# Patient Record
Sex: MALE | Race: OTHER | HISPANIC OR LATINO | URBAN - METROPOLITAN AREA
[De-identification: names, ages, dates, MRNs, and addresses within clinical notes are randomized per-mention and may not be internally consistent; named-entity substitution may affect disease eponyms.]

---

## 2018-11-11 ENCOUNTER — EMERGENCY (EMERGENCY)
Facility: HOSPITAL | Age: 43
LOS: 0 days | Discharge: HOME | End: 2018-11-11
Attending: STUDENT IN AN ORGANIZED HEALTH CARE EDUCATION/TRAINING PROGRAM | Admitting: STUDENT IN AN ORGANIZED HEALTH CARE EDUCATION/TRAINING PROGRAM

## 2018-11-11 VITALS
RESPIRATION RATE: 18 BRPM | HEART RATE: 68 BPM | OXYGEN SATURATION: 98 % | TEMPERATURE: 97 F | SYSTOLIC BLOOD PRESSURE: 142 MMHG | DIASTOLIC BLOOD PRESSURE: 80 MMHG

## 2018-11-11 VITALS
HEART RATE: 63 BPM | DIASTOLIC BLOOD PRESSURE: 66 MMHG | OXYGEN SATURATION: 100 % | RESPIRATION RATE: 18 BRPM | SYSTOLIC BLOOD PRESSURE: 126 MMHG | TEMPERATURE: 98 F

## 2018-11-11 DIAGNOSIS — R89.9 UNSPECIFIED ABNORMAL FINDING IN SPECIMENS FROM OTHER ORGANS, SYSTEMS AND TISSUES: ICD-10-CM

## 2018-11-11 DIAGNOSIS — K21.9 GASTRO-ESOPHAGEAL REFLUX DISEASE WITHOUT ESOPHAGITIS: ICD-10-CM

## 2018-11-11 DIAGNOSIS — N18.9 CHRONIC KIDNEY DISEASE, UNSPECIFIED: ICD-10-CM

## 2018-11-11 LAB
ALBUMIN SERPL ELPH-MCNC: 4.3 G/DL — SIGNIFICANT CHANGE UP (ref 3.5–5.2)
ALP SERPL-CCNC: 71 U/L — SIGNIFICANT CHANGE UP (ref 30–115)
ALT FLD-CCNC: 6 U/L — SIGNIFICANT CHANGE UP (ref 0–41)
ANION GAP SERPL CALC-SCNC: 19 MMOL/L — HIGH (ref 7–14)
AST SERPL-CCNC: 9 U/L — SIGNIFICANT CHANGE UP (ref 0–41)
BASOPHILS # BLD AUTO: 0.05 K/UL — SIGNIFICANT CHANGE UP (ref 0–0.2)
BASOPHILS NFR BLD AUTO: 0.6 % — SIGNIFICANT CHANGE UP (ref 0–1)
BILIRUB SERPL-MCNC: 0.2 MG/DL — SIGNIFICANT CHANGE UP (ref 0.2–1.2)
BUN SERPL-MCNC: 49 MG/DL — HIGH (ref 10–20)
CALCIUM SERPL-MCNC: 7.1 MG/DL — LOW (ref 8.5–10.1)
CHLORIDE SERPL-SCNC: 105 MMOL/L — SIGNIFICANT CHANGE UP (ref 98–110)
CO2 SERPL-SCNC: 17 MMOL/L — SIGNIFICANT CHANGE UP (ref 17–32)
CREAT SERPL-MCNC: 7 MG/DL — CRITICAL HIGH (ref 0.7–1.5)
EOSINOPHIL # BLD AUTO: 0.34 K/UL — SIGNIFICANT CHANGE UP (ref 0–0.7)
EOSINOPHIL NFR BLD AUTO: 4 % — SIGNIFICANT CHANGE UP (ref 0–8)
GLUCOSE SERPL-MCNC: 97 MG/DL — SIGNIFICANT CHANGE UP (ref 70–99)
HCT VFR BLD CALC: 30.7 % — LOW (ref 42–52)
HGB BLD-MCNC: 9.6 G/DL — LOW (ref 14–18)
IMM GRANULOCYTES NFR BLD AUTO: 0.2 % — SIGNIFICANT CHANGE UP (ref 0.1–0.3)
LYMPHOCYTES # BLD AUTO: 1.02 K/UL — LOW (ref 1.2–3.4)
LYMPHOCYTES # BLD AUTO: 12 % — LOW (ref 20.5–51.1)
MCHC RBC-ENTMCNC: 30 PG — SIGNIFICANT CHANGE UP (ref 27–31)
MCHC RBC-ENTMCNC: 31.3 G/DL — LOW (ref 32–37)
MCV RBC AUTO: 95.9 FL — HIGH (ref 80–94)
MONOCYTES # BLD AUTO: 0.57 K/UL — SIGNIFICANT CHANGE UP (ref 0.1–0.6)
MONOCYTES NFR BLD AUTO: 6.7 % — SIGNIFICANT CHANGE UP (ref 1.7–9.3)
NEUTROPHILS # BLD AUTO: 6.52 K/UL — HIGH (ref 1.4–6.5)
NEUTROPHILS NFR BLD AUTO: 76.5 % — HIGH (ref 42.2–75.2)
NRBC # BLD: 0 /100 WBCS — SIGNIFICANT CHANGE UP (ref 0–0)
PLATELET # BLD AUTO: 269 K/UL — SIGNIFICANT CHANGE UP (ref 130–400)
POTASSIUM SERPL-MCNC: 5 MMOL/L — SIGNIFICANT CHANGE UP (ref 3.5–5)
POTASSIUM SERPL-SCNC: 5 MMOL/L — SIGNIFICANT CHANGE UP (ref 3.5–5)
PROT SERPL-MCNC: 6.7 G/DL — SIGNIFICANT CHANGE UP (ref 6–8)
RBC # BLD: 3.2 M/UL — LOW (ref 4.7–6.1)
RBC # FLD: 13.2 % — SIGNIFICANT CHANGE UP (ref 11.5–14.5)
SODIUM SERPL-SCNC: 141 MMOL/L — SIGNIFICANT CHANGE UP (ref 135–146)
WBC # BLD: 8.52 K/UL — SIGNIFICANT CHANGE UP (ref 4.8–10.8)
WBC # FLD AUTO: 8.52 K/UL — SIGNIFICANT CHANGE UP (ref 4.8–10.8)

## 2018-11-11 NOTE — ED ADULT NURSE NOTE - OBJECTIVE STATEMENT
pt reports he had blood work drawn a few days ago and was told his creatnine was elevated. Pt denies pain or discomfort but reports urinary frequency

## 2018-11-11 NOTE — ED PROVIDER NOTE - NSFOLLOWUPINSTRUCTIONS_ED_ALL_ED_FT
Chronic Kidney Disease, Adult    Chronic kidney disease (CKD) happens when the kidneys are damaged during a time of 3 or more months. The kidneys are two organs that do many important jobs in the body. These jobs include:    Removing wastes and extra fluids from the blood.  Making hormones that maintain the amount of fluid in your tissues and blood vessels.  Making sure that the body has the right amount of fluids and chemicals.    Most of the time, this condition does not go away, but it can usually be controlled. Steps must be taken to slow down the kidney damage or stop it from getting worse. Otherwise, the kidneys may stop working.     HOME CARE  Follow your diet as told by your doctor. You may need to avoid alcohol, salty foods (sodium), and foods that are high in potassium, calcium, and protein.  Take over-the-counter and prescription medicines only as told by your doctor. Do not take any new medicines unless your doctor says you can do that. These include vitamins and minerals.  Medicines and nutritional supplements can make kidney damage worse.  Your doctor may need to change how much medicine you take.  Do not use any tobacco products. These include cigarettes, chewing tobacco, and e-cigarettes. If you need help quitting, ask your doctor.  Keep all follow-up visits as told by your doctor. This is important.  Check your blood pressure. Tell your doctor if there are changes to your blood pressure.  Get to a healthy weight. Stay at that weight. If you need help with this, ask your doctor.  Start or continue an exercise plan. Try to exercise at least 30 minutes a day, 5 days a week.  Stay up-to-date with your shots (immunizations) as told by your doctor.    GET HELP IF:  Your symptoms get worse.  You have new symptoms.    GET HELP RIGHT AWAY IF:  You have symptoms of end-stage kidney disease. These include:  Headaches.  Skin that is darker or lighter than normal.  Numbness in your hands or feet.  Easy bruising.  Having hiccups often.  Chest pain.  Shortness of breath.  Stopping of menstrual periods in women.  You have a fever.  You are making very little pee (urine).  You have pain or bleeding when you pee (urinate).

## 2018-11-11 NOTE — ED PROVIDER NOTE - CARE PROVIDER_API CALL
Chan, Yeoman K (MD), Family Medicine  [address] [facility]  [address]  [place] [address]  Phone: [phone]  Fax: [phone]    Jose Burns), Internal Medicine; Nephrology  [address] [place] [address]  Phone: [phone]  Fax: [phone]

## 2018-11-11 NOTE — ED PROVIDER NOTE - OBJECTIVE STATEMENT
44 y/o M pmh renal disease, GERD, referred to ED by PMD Dr. Simmons for abnl "kidney" labs found on  routine blood work.  Pt has no complaints. Had followed w/ Dr. Burns years ago.

## 2018-11-11 NOTE — ED ADULT NURSE NOTE - CHPI ED NUR SYMPTOMS NEG
no abdominal distension/no diarrhea/no dysuria/no fever/no nausea/no blood in stool/no hematuria/no chills

## 2018-11-11 NOTE — ED PROVIDER NOTE - NS ED ROS FT
Constitutional:  NO fever  Eyes:  No visual changes  ENMT: No neck pain or stiffness  Cardiac:  No chest pain  Respiratory:  No cough or respiratory distress.   GI:  No nausea, vomiting, diarrhea or abdominal pain.  :  No dysuria, frequency or burning.  MS:  No back pain.  Neuro:  No headache   Skin:  No skin rash  Except as documented in the HPI,  all other systems are negative

## 2020-07-06 ENCOUNTER — INPATIENT (INPATIENT)
Facility: HOSPITAL | Age: 45
LOS: 3 days | Discharge: HOME | End: 2020-07-10
Attending: INTERNAL MEDICINE | Admitting: INTERNAL MEDICINE
Payer: COMMERCIAL

## 2020-07-06 VITALS
RESPIRATION RATE: 20 BRPM | OXYGEN SATURATION: 100 % | TEMPERATURE: 98 F | DIASTOLIC BLOOD PRESSURE: 78 MMHG | WEIGHT: 132.06 LBS | HEART RATE: 90 BPM | SYSTOLIC BLOOD PRESSURE: 150 MMHG

## 2020-07-06 LAB
ABO RH CONFIRMATION: SIGNIFICANT CHANGE UP
ALBUMIN SERPL ELPH-MCNC: 4.6 G/DL — SIGNIFICANT CHANGE UP (ref 3.5–5.2)
ALP SERPL-CCNC: 56 U/L — SIGNIFICANT CHANGE UP (ref 30–115)
ALT FLD-CCNC: 5 U/L — SIGNIFICANT CHANGE UP (ref 0–41)
ANION GAP SERPL CALC-SCNC: 30 MMOL/L — HIGH (ref 7–14)
APPEARANCE UR: CLEAR — SIGNIFICANT CHANGE UP
APTT BLD: 27.7 SEC — SIGNIFICANT CHANGE UP (ref 27–39.2)
AST SERPL-CCNC: 5 U/L — SIGNIFICANT CHANGE UP (ref 0–41)
BACTERIA # UR AUTO: NEGATIVE — SIGNIFICANT CHANGE UP
BASOPHILS # BLD AUTO: 0.02 K/UL — SIGNIFICANT CHANGE UP (ref 0–0.2)
BASOPHILS NFR BLD AUTO: 0.3 % — SIGNIFICANT CHANGE UP (ref 0–1)
BILIRUB SERPL-MCNC: 0.5 MG/DL — SIGNIFICANT CHANGE UP (ref 0.2–1.2)
BILIRUB UR-MCNC: NEGATIVE — SIGNIFICANT CHANGE UP
BLD GP AB SCN SERPL QL: SIGNIFICANT CHANGE UP
BUN SERPL-MCNC: 157 MG/DL — CRITICAL HIGH (ref 10–20)
CALCIUM SERPL-MCNC: 5 MG/DL — CRITICAL LOW (ref 8.5–10.1)
CHLORIDE SERPL-SCNC: 99 MMOL/L — SIGNIFICANT CHANGE UP (ref 98–110)
CO2 SERPL-SCNC: 10 MMOL/L — LOW (ref 17–32)
COLOR SPEC: COLORLESS — SIGNIFICANT CHANGE UP
CREAT SERPL-MCNC: 19.7 MG/DL — CRITICAL HIGH (ref 0.7–1.5)
DIFF PNL FLD: ABNORMAL
EOSINOPHIL # BLD AUTO: 0.13 K/UL — SIGNIFICANT CHANGE UP (ref 0–0.7)
EOSINOPHIL NFR BLD AUTO: 1.7 % — SIGNIFICANT CHANGE UP (ref 0–8)
EPI CELLS # UR: 0 /HPF — SIGNIFICANT CHANGE UP (ref 0–5)
GLUCOSE SERPL-MCNC: 121 MG/DL — HIGH (ref 70–99)
GLUCOSE UR QL: NEGATIVE — SIGNIFICANT CHANGE UP
HCT VFR BLD CALC: 19.3 % — LOW (ref 42–52)
HGB BLD-MCNC: 6.3 G/DL — CRITICAL LOW (ref 14–18)
HYALINE CASTS # UR AUTO: 0 /LPF — SIGNIFICANT CHANGE UP (ref 0–7)
IMM GRANULOCYTES NFR BLD AUTO: 0.4 % — HIGH (ref 0.1–0.3)
INR BLD: 1.12 RATIO — SIGNIFICANT CHANGE UP (ref 0.65–1.3)
KETONES UR-MCNC: NEGATIVE — SIGNIFICANT CHANGE UP
LACTATE SERPL-SCNC: 0.7 MMOL/L — SIGNIFICANT CHANGE UP (ref 0.7–2)
LEUKOCYTE ESTERASE UR-ACNC: NEGATIVE — SIGNIFICANT CHANGE UP
LIDOCAIN IGE QN: 88 U/L — HIGH (ref 7–60)
LYMPHOCYTES # BLD AUTO: 0.43 K/UL — LOW (ref 1.2–3.4)
LYMPHOCYTES # BLD AUTO: 5.5 % — LOW (ref 20.5–51.1)
MAGNESIUM SERPL-MCNC: 1.7 MG/DL — LOW (ref 1.8–2.4)
MCHC RBC-ENTMCNC: 31 PG — SIGNIFICANT CHANGE UP (ref 27–31)
MCHC RBC-ENTMCNC: 32.6 G/DL — SIGNIFICANT CHANGE UP (ref 32–37)
MCV RBC AUTO: 95.1 FL — HIGH (ref 80–94)
MONOCYTES # BLD AUTO: 0.33 K/UL — SIGNIFICANT CHANGE UP (ref 0.1–0.6)
MONOCYTES NFR BLD AUTO: 4.2 % — SIGNIFICANT CHANGE UP (ref 1.7–9.3)
NEUTROPHILS # BLD AUTO: 6.86 K/UL — HIGH (ref 1.4–6.5)
NEUTROPHILS NFR BLD AUTO: 87.9 % — HIGH (ref 42.2–75.2)
NITRITE UR-MCNC: NEGATIVE — SIGNIFICANT CHANGE UP
NRBC # BLD: 0 /100 WBCS — SIGNIFICANT CHANGE UP (ref 0–0)
PH UR: 6 — SIGNIFICANT CHANGE UP (ref 5–8)
PLATELET # BLD AUTO: 235 K/UL — SIGNIFICANT CHANGE UP (ref 130–400)
POTASSIUM SERPL-MCNC: 5.1 MMOL/L — HIGH (ref 3.5–5)
POTASSIUM SERPL-SCNC: 5.1 MMOL/L — HIGH (ref 3.5–5)
PROT SERPL-MCNC: 7 G/DL — SIGNIFICANT CHANGE UP (ref 6–8)
PROT UR-MCNC: ABNORMAL
PROTHROM AB SERPL-ACNC: 12.9 SEC — HIGH (ref 9.95–12.87)
RBC # BLD: 2.03 M/UL — LOW (ref 4.7–6.1)
RBC # FLD: 12.7 % — SIGNIFICANT CHANGE UP (ref 11.5–14.5)
RBC CASTS # UR COMP ASSIST: 3 /HPF — SIGNIFICANT CHANGE UP (ref 0–4)
SODIUM SERPL-SCNC: 139 MMOL/L — SIGNIFICANT CHANGE UP (ref 135–146)
SP GR SPEC: 1.01 — SIGNIFICANT CHANGE UP (ref 1.01–1.02)
TROPONIN T SERPL-MCNC: 0.02 NG/ML — HIGH
UROBILINOGEN FLD QL: SIGNIFICANT CHANGE UP
WBC # BLD: 7.8 K/UL — SIGNIFICANT CHANGE UP (ref 4.8–10.8)
WBC # FLD AUTO: 7.8 K/UL — SIGNIFICANT CHANGE UP (ref 4.8–10.8)
WBC UR QL: 3 /HPF — SIGNIFICANT CHANGE UP (ref 0–5)

## 2020-07-06 PROCEDURE — 99285 EMERGENCY DEPT VISIT HI MDM: CPT

## 2020-07-06 PROCEDURE — 93010 ELECTROCARDIOGRAM REPORT: CPT

## 2020-07-06 PROCEDURE — 71046 X-RAY EXAM CHEST 2 VIEWS: CPT | Mod: 26

## 2020-07-06 PROCEDURE — 76770 US EXAM ABDO BACK WALL COMP: CPT | Mod: 26

## 2020-07-06 RX ORDER — MAGNESIUM SULFATE 500 MG/ML
2 VIAL (ML) INJECTION ONCE
Refills: 0 | Status: COMPLETED | OUTPATIENT
Start: 2020-07-06 | End: 2020-07-06

## 2020-07-06 RX ORDER — SODIUM CHLORIDE 9 MG/ML
1000 INJECTION INTRAMUSCULAR; INTRAVENOUS; SUBCUTANEOUS ONCE
Refills: 0 | Status: COMPLETED | OUTPATIENT
Start: 2020-07-06 | End: 2020-07-06

## 2020-07-06 RX ADMIN — SODIUM CHLORIDE 1000 MILLILITER(S): 9 INJECTION INTRAMUSCULAR; INTRAVENOUS; SUBCUTANEOUS at 19:59

## 2020-07-06 RX ADMIN — Medication 2 GRAM(S): at 19:59

## 2020-07-06 RX ADMIN — Medication 50 GRAM(S): at 19:59

## 2020-07-06 RX ADMIN — SODIUM CHLORIDE 1000 MILLILITER(S): 9 INJECTION INTRAMUSCULAR; INTRAVENOUS; SUBCUTANEOUS at 18:19

## 2020-07-06 NOTE — ED PROVIDER NOTE - OBJECTIVE STATEMENT
44 year old male with pmhx of esrd, non compliant, presents with weakness, vomiting, and nausea x 1 week. Pt denies fever, chills, chest pain, sob, abd pain, diarrhea, cough, or urinary symptoms. pt still making urine. pt has seen dr greene over a year and a half ago, spoke about dialysis but pt did not follow up .

## 2020-07-06 NOTE — ED PROVIDER NOTE - NS ED ROS FT
Review of Systems:  	•	CONSTITUTIONAL - no fever, no diaphoresis, no chills  	•	SKIN - no rash  	•	HEMATOLOGIC - no bleeding, no bruising  	•	EYES - no eye pain, no blurry vision  	•	ENT - no change in hearing, no sore throat, no ear pain or tinnitus  	•	RESPIRATORY - no shortness of breath, no cough  	•	CARDIAC - no chest pain, no palpitations  	•	GI - no abd pain, + nausea, + vomiting, no diarrhea, no constipation  	•	GENITO-URINARY - no discharge, no dysuria; no hematuria, no increased urinary frequency  	•	MUSCULOSKELETAL - no joint paint, no swelling, no redness  	•	NEUROLOGIC - + weakness, no headache, no paresthesias, no LOC  	•	PSYCH - no anxiety, non suicidal, non homicidal, no hallucination, no depression

## 2020-07-06 NOTE — ED PROVIDER NOTE - CARE PLAN
Principal Discharge DX:	ESRD (end stage renal disease)  Secondary Diagnosis:	Anemia Principal Discharge DX:	ESRD (end stage renal disease)  Secondary Diagnosis:	Anemia  Secondary Diagnosis:	Elevated troponin  Secondary Diagnosis:	Prolonged QT interval

## 2020-07-06 NOTE — ED PROVIDER NOTE - PROGRESS NOTE DETAILS
spoke with renal dr greene, recommend bladder scan, no need for looney at this time. will try for diaylsis tomorrow spoke with renal dr greene, recommend bladder scan, no need for looney at this time. will try for dialysis tomorrow endorsed to mar

## 2020-07-06 NOTE — ED PROVIDER NOTE - CLINICAL SUMMARY MEDICAL DECISION MAKING FREE TEXT BOX
43 yo male with ESRD, poor outpatient compliance, now feeling worse with N/V/CP/weakness. Pt in florid renal failure, and has slight elevation in trop (0.02, which could be from the renal failure), slight QTc prolongation at 503 with slight low Mag (replaced in ER). Pt hgb 6.3 likely from worsening renal failure (consent obtained for blood transfusion). Pt to be placed in Tele, monitor given aforementioned issues and slight elevation in K of 5.1 (to monitor that it doesn't worsen by the am), needs r/o acs, monitor on tele, and dialysis. Admit. (nephro doc Valerio called by KRYS Pillai)

## 2020-07-06 NOTE — ED PROVIDER NOTE - PHYSICAL EXAMINATION
CONST: Well appearing in NAD  EYES: PERRL, EOMI, Sclera and conjunctiva clear.   ENT: No nasal discharge.  Oropharynx normal appearing, no erythema or exudates. No abscess or swelling. Uvula midline.   NECK: Non-tender, no meningeal signs. normal ROM. supple   CARD: Normal S1 S2; Normal rate and rhythm  RESP: Equal BS B/L, No wheezes, rhonchi or rales. No distress  GI: Soft, non-tender, non-distended. no cva tenderness. normal BS  MS: Normal ROM in all extremities. No midline spinal tenderness. pulses 2 +. no calf tenderness or swelling  SKIN: Warm, dry, no acute rashes. Good turgor  NEURO: A&Ox3, No focal deficits. Strength 5/5 with no sensory deficits. Steady gait. Finger to nose intact. Negative pronator drift

## 2020-07-06 NOTE — ED PROVIDER NOTE - ATTENDING CONTRIBUTION TO CARE
45 yo male with pmh of ESRD  (see's Dr Le) but is poorly compliant with follow up, presents to the ER for weakness, vomiting, nausea and CP x 1 week. States he has no fever, chills, SOB, cough, diarrhea, dysuria (does make urine). CP mild, mid chest, worse after vomiting. NO rash/trauma/fever/chills/HA/neck pain/leg swelling. Mild lightheadedness. No focal neuro deficits. Concerned maybe kidney's doing worse. Pt has not had recent stress testing but cp seems to come after vomiting and may be from the wretching. Exam as per PA note. Will check labs, ekg, xray, and trop given CP. Check UA. Pt appears unwell, but not acutely in distress. Likely to admit.

## 2020-07-06 NOTE — ED ADULT NURSE NOTE - OBJECTIVE STATEMENT
Pt presented to ED c/o med eval. Pt c/o decrease in appetite x 1 month, weight loss, and n/v. Pt denies diarrhea/fevers/chills. Pt A&Ox4, ambulatory.

## 2020-07-07 DIAGNOSIS — Z90.49 ACQUIRED ABSENCE OF OTHER SPECIFIED PARTS OF DIGESTIVE TRACT: Chronic | ICD-10-CM

## 2020-07-07 LAB
24R-OH-CALCIDIOL SERPL-MCNC: 30 NG/ML — SIGNIFICANT CHANGE UP (ref 30–80)
A1C WITH ESTIMATED AVERAGE GLUCOSE RESULT: 5.7 % — HIGH (ref 4–5.6)
ALBUMIN SERPL ELPH-MCNC: 4.3 G/DL — SIGNIFICANT CHANGE UP (ref 3.5–5.2)
ALP SERPL-CCNC: 54 U/L — SIGNIFICANT CHANGE UP (ref 30–115)
ALT FLD-CCNC: <5 U/L — SIGNIFICANT CHANGE UP (ref 0–41)
ANION GAP SERPL CALC-SCNC: 26 MMOL/L — HIGH (ref 7–14)
ANION GAP SERPL CALC-SCNC: 27 MMOL/L — HIGH (ref 7–14)
APPEARANCE UR: CLEAR — SIGNIFICANT CHANGE UP
AST SERPL-CCNC: <5 U/L — SIGNIFICANT CHANGE UP (ref 0–41)
BACTERIA # UR AUTO: NEGATIVE — SIGNIFICANT CHANGE UP
BASOPHILS # BLD AUTO: 0.02 K/UL — SIGNIFICANT CHANGE UP (ref 0–0.2)
BASOPHILS NFR BLD AUTO: 0.2 % — SIGNIFICANT CHANGE UP (ref 0–1)
BILIRUB SERPL-MCNC: 0.5 MG/DL — SIGNIFICANT CHANGE UP (ref 0.2–1.2)
BILIRUB UR-MCNC: NEGATIVE — SIGNIFICANT CHANGE UP
BLD GP AB SCN SERPL QL: SIGNIFICANT CHANGE UP
BUN SERPL-MCNC: 148 MG/DL — CRITICAL HIGH (ref 10–20)
BUN SERPL-MCNC: 155 MG/DL — CRITICAL HIGH (ref 10–20)
CALCIUM SERPL-MCNC: 5.2 MG/DL — CRITICAL LOW (ref 8.5–10.1)
CALCIUM SERPL-MCNC: 5.9 MG/DL — CRITICAL LOW (ref 8.5–10.1)
CHLORIDE SERPL-SCNC: 101 MMOL/L — SIGNIFICANT CHANGE UP (ref 98–110)
CHLORIDE SERPL-SCNC: 104 MMOL/L — SIGNIFICANT CHANGE UP (ref 98–110)
CHOLEST SERPL-MCNC: 151 MG/DL — SIGNIFICANT CHANGE UP (ref 100–200)
CO2 SERPL-SCNC: 11 MMOL/L — LOW (ref 17–32)
CO2 SERPL-SCNC: 11 MMOL/L — LOW (ref 17–32)
COLOR SPEC: COLORLESS — SIGNIFICANT CHANGE UP
CREAT ?TM UR-MCNC: 57 MG/DL — SIGNIFICANT CHANGE UP
CREAT SERPL-MCNC: 18.2 MG/DL — CRITICAL HIGH (ref 0.7–1.5)
CREAT SERPL-MCNC: 18.9 MG/DL — CRITICAL HIGH (ref 0.7–1.5)
DIFF PNL FLD: ABNORMAL
EOSINOPHIL # BLD AUTO: 0.21 K/UL — SIGNIFICANT CHANGE UP (ref 0–0.7)
EOSINOPHIL NFR BLD AUTO: 2.5 % — SIGNIFICANT CHANGE UP (ref 0–8)
EPI CELLS # UR: 0 /HPF — SIGNIFICANT CHANGE UP (ref 0–5)
ESTIMATED AVERAGE GLUCOSE: 117 MG/DL — HIGH (ref 68–114)
FERRITIN SERPL-MCNC: 199 NG/ML — SIGNIFICANT CHANGE UP (ref 30–400)
GLUCOSE SERPL-MCNC: 110 MG/DL — HIGH (ref 70–99)
GLUCOSE SERPL-MCNC: 157 MG/DL — HIGH (ref 70–99)
GLUCOSE UR QL: NEGATIVE — SIGNIFICANT CHANGE UP
HCT VFR BLD CALC: 21.5 % — LOW (ref 42–52)
HCT VFR BLD CALC: 22.7 % — LOW (ref 42–52)
HDLC SERPL-MCNC: 29 MG/DL — LOW
HGB BLD-MCNC: 6.9 G/DL — CRITICAL LOW (ref 14–18)
HGB BLD-MCNC: 7.3 G/DL — LOW (ref 14–18)
HYALINE CASTS # UR AUTO: 0 /LPF — SIGNIFICANT CHANGE UP (ref 0–7)
IMM GRANULOCYTES NFR BLD AUTO: 0.5 % — HIGH (ref 0.1–0.3)
IRON SATN MFR SERPL: 151 UG/DL — HIGH (ref 35–150)
IRON SATN MFR SERPL: 71 % — HIGH (ref 15–50)
KETONES UR-MCNC: NEGATIVE — SIGNIFICANT CHANGE UP
LEUKOCYTE ESTERASE UR-ACNC: NEGATIVE — SIGNIFICANT CHANGE UP
LIPID PNL WITH DIRECT LDL SERPL: 105 MG/DL — SIGNIFICANT CHANGE UP (ref 4–129)
LYMPHOCYTES # BLD AUTO: 0.43 K/UL — LOW (ref 1.2–3.4)
LYMPHOCYTES # BLD AUTO: 5.1 % — LOW (ref 20.5–51.1)
MAGNESIUM SERPL-MCNC: 2.2 MG/DL — SIGNIFICANT CHANGE UP (ref 1.8–2.4)
MAGNESIUM SERPL-MCNC: 2.3 MG/DL — SIGNIFICANT CHANGE UP (ref 1.8–2.4)
MCHC RBC-ENTMCNC: 30 PG — SIGNIFICANT CHANGE UP (ref 27–31)
MCHC RBC-ENTMCNC: 30.1 PG — SIGNIFICANT CHANGE UP (ref 27–31)
MCHC RBC-ENTMCNC: 32.1 G/DL — SIGNIFICANT CHANGE UP (ref 32–37)
MCHC RBC-ENTMCNC: 32.2 G/DL — SIGNIFICANT CHANGE UP (ref 32–37)
MCV RBC AUTO: 93.4 FL — SIGNIFICANT CHANGE UP (ref 80–94)
MCV RBC AUTO: 93.9 FL — SIGNIFICANT CHANGE UP (ref 80–94)
MONOCYTES # BLD AUTO: 0.5 K/UL — SIGNIFICANT CHANGE UP (ref 0.1–0.6)
MONOCYTES NFR BLD AUTO: 5.9 % — SIGNIFICANT CHANGE UP (ref 1.7–9.3)
NEUTROPHILS # BLD AUTO: 7.26 K/UL — HIGH (ref 1.4–6.5)
NEUTROPHILS NFR BLD AUTO: 85.8 % — HIGH (ref 42.2–75.2)
NITRITE UR-MCNC: NEGATIVE — SIGNIFICANT CHANGE UP
NRBC # BLD: 0 /100 WBCS — SIGNIFICANT CHANGE UP (ref 0–0)
NRBC # BLD: 0 /100 WBCS — SIGNIFICANT CHANGE UP (ref 0–0)
OSMOLALITY UR: 303 MOS/KG — SIGNIFICANT CHANGE UP (ref 50–1400)
PH UR: 6 — SIGNIFICANT CHANGE UP (ref 5–8)
PHOSPHATE SERPL-MCNC: 11.1 MG/DL — HIGH (ref 2.1–4.9)
PHOSPHATE SERPL-MCNC: 11.6 MG/DL — HIGH (ref 2.1–4.9)
PLATELET # BLD AUTO: 197 K/UL — SIGNIFICANT CHANGE UP (ref 130–400)
PLATELET # BLD AUTO: 230 K/UL — SIGNIFICANT CHANGE UP (ref 130–400)
POTASSIUM SERPL-MCNC: 4.8 MMOL/L — SIGNIFICANT CHANGE UP (ref 3.5–5)
POTASSIUM SERPL-MCNC: 4.9 MMOL/L — SIGNIFICANT CHANGE UP (ref 3.5–5)
POTASSIUM SERPL-SCNC: 4.8 MMOL/L — SIGNIFICANT CHANGE UP (ref 3.5–5)
POTASSIUM SERPL-SCNC: 4.9 MMOL/L — SIGNIFICANT CHANGE UP (ref 3.5–5)
PROT SERPL-MCNC: 6.5 G/DL — SIGNIFICANT CHANGE UP (ref 6–8)
PROT UR-MCNC: ABNORMAL
RBC # BLD: 2.29 M/UL — LOW (ref 4.7–6.1)
RBC # BLD: 2.43 M/UL — LOW (ref 4.7–6.1)
RBC # FLD: 13.2 % — SIGNIFICANT CHANGE UP (ref 11.5–14.5)
RBC # FLD: 13.5 % — SIGNIFICANT CHANGE UP (ref 11.5–14.5)
RBC CASTS # UR COMP ASSIST: 1 /HPF — SIGNIFICANT CHANGE UP (ref 0–4)
SARS-COV-2 RNA SPEC QL NAA+PROBE: SIGNIFICANT CHANGE UP
SODIUM SERPL-SCNC: 138 MMOL/L — SIGNIFICANT CHANGE UP (ref 135–146)
SODIUM SERPL-SCNC: 142 MMOL/L — SIGNIFICANT CHANGE UP (ref 135–146)
SODIUM UR-SCNC: 77 MMOL/L — SIGNIFICANT CHANGE UP
SP GR SPEC: 1.01 — SIGNIFICANT CHANGE UP (ref 1.01–1.02)
TIBC SERPL-MCNC: 213 UG/DL — LOW (ref 220–430)
TOTAL CHOLESTEROL/HDL RATIO MEASUREMENT: 5.2 RATIO — SIGNIFICANT CHANGE UP (ref 4–5.5)
TRANSFERRIN SERPL-MCNC: 196 MG/DL — LOW (ref 200–360)
TRIGL SERPL-MCNC: 107 MG/DL — SIGNIFICANT CHANGE UP (ref 10–149)
TROPONIN T SERPL-MCNC: 0.02 NG/ML — HIGH
UIBC SERPL-MCNC: 62 UG/DL — LOW (ref 110–370)
URATE UR-MCNC: 6.9 MG/DL — SIGNIFICANT CHANGE UP
UROBILINOGEN FLD QL: SIGNIFICANT CHANGE UP
WBC # BLD: 8.28 K/UL — SIGNIFICANT CHANGE UP (ref 4.8–10.8)
WBC # BLD: 8.46 K/UL — SIGNIFICANT CHANGE UP (ref 4.8–10.8)
WBC # FLD AUTO: 8.28 K/UL — SIGNIFICANT CHANGE UP (ref 4.8–10.8)
WBC # FLD AUTO: 8.46 K/UL — SIGNIFICANT CHANGE UP (ref 4.8–10.8)
WBC UR QL: 1 /HPF — SIGNIFICANT CHANGE UP (ref 0–5)

## 2020-07-07 PROCEDURE — 99223 1ST HOSP IP/OBS HIGH 75: CPT

## 2020-07-07 PROCEDURE — 70450 CT HEAD/BRAIN W/O DYE: CPT | Mod: 26

## 2020-07-07 RX ORDER — PANTOPRAZOLE SODIUM 20 MG/1
40 TABLET, DELAYED RELEASE ORAL
Refills: 0 | Status: DISCONTINUED | OUTPATIENT
Start: 2020-07-07 | End: 2020-07-08

## 2020-07-07 RX ORDER — DOXERCALCIFEROL 2.5 UG/1
4 CAPSULE ORAL ONCE
Refills: 0 | Status: COMPLETED | OUTPATIENT
Start: 2020-07-07 | End: 2020-07-07

## 2020-07-07 RX ORDER — CALCIUM ACETATE 667 MG
2001 TABLET ORAL
Refills: 0 | Status: DISCONTINUED | OUTPATIENT
Start: 2020-07-07 | End: 2020-07-08

## 2020-07-07 RX ORDER — HEPARIN SODIUM 5000 [USP'U]/ML
5000 INJECTION INTRAVENOUS; SUBCUTANEOUS EVERY 12 HOURS
Refills: 0 | Status: DISCONTINUED | OUTPATIENT
Start: 2020-07-07 | End: 2020-07-07

## 2020-07-07 RX ORDER — SODIUM CHLORIDE 9 MG/ML
1000 INJECTION INTRAMUSCULAR; INTRAVENOUS; SUBCUTANEOUS
Refills: 0 | Status: DISCONTINUED | OUTPATIENT
Start: 2020-07-07 | End: 2020-07-08

## 2020-07-07 RX ORDER — SUCRALFATE 1 G
0 TABLET ORAL
Qty: 0 | Refills: 0 | DISCHARGE

## 2020-07-07 RX ORDER — PROCHLORPERAZINE MALEATE 5 MG
5 TABLET ORAL
Refills: 0 | Status: DISCONTINUED | OUTPATIENT
Start: 2020-07-07 | End: 2020-07-08

## 2020-07-07 RX ORDER — PROCHLORPERAZINE MALEATE 5 MG
5 TABLET ORAL EVERY 6 HOURS
Refills: 0 | Status: DISCONTINUED | OUTPATIENT
Start: 2020-07-07 | End: 2020-07-07

## 2020-07-07 RX ORDER — OMEPRAZOLE 10 MG/1
0 CAPSULE, DELAYED RELEASE ORAL
Qty: 0 | Refills: 0 | DISCHARGE

## 2020-07-07 RX ORDER — CALCIUM GLUCONATE 100 MG/ML
1 VIAL (ML) INTRAVENOUS ONCE
Refills: 0 | Status: COMPLETED | OUTPATIENT
Start: 2020-07-07 | End: 2020-07-07

## 2020-07-07 RX ORDER — CHLORHEXIDINE GLUCONATE 213 G/1000ML
1 SOLUTION TOPICAL
Refills: 0 | Status: DISCONTINUED | OUTPATIENT
Start: 2020-07-07 | End: 2020-07-08

## 2020-07-07 RX ADMIN — Medication 5 MILLIGRAM(S): at 16:53

## 2020-07-07 RX ADMIN — Medication 100 GRAM(S): at 07:56

## 2020-07-07 RX ADMIN — Medication 2001 MILLIGRAM(S): at 16:54

## 2020-07-07 RX ADMIN — DOXERCALCIFEROL 4 MICROGRAM(S): 2.5 CAPSULE ORAL at 16:53

## 2020-07-07 RX ADMIN — SODIUM CHLORIDE 75 MILLILITER(S): 9 INJECTION INTRAMUSCULAR; INTRAVENOUS; SUBCUTANEOUS at 16:05

## 2020-07-07 NOTE — H&P ADULT - ASSESSMENT
44 Y M with pmh of CKD4 (follows with Dr. Luo, has not been compliant with follow-up), HTN (not on meds), HLD (not on meds) presents to ED with fatigue for 2 months and vomiting for last week. He says he has been so tired that he has had trouble getting out of bed for the last few months. NBNB vomiting for last week every morning. Found to have Cr of 19.7, anemic to 6.3 s/p 1 unit prbc in ED.    #HANS on CKD4  -nephrology consult Dr. Luo  -patient has very poor follow up with nephrologist  -will most likely need HD, not urgent currently though  -follow up nephrology recommendations  -check lytes, replete as needed   -check anti-DNAse b, DANIEL, urine lytes, UPEP  -troponin 0.02 on admission most likely 2/2 renal failure, trend    #Normocytic anemia- patient does not report any overt GI or  bleeding   -s/p 1 unit prbc in ED  -some microscopic hematuria on UA  -check iron studies  -keep active t/s    #Hypocalcemia  -not symptomatic  -check pth, vitamin d levels  -most likely 2/2 ckd  -give 1g calcium gluconate    #Hyperkalemia  -5.1 on admission, f/u bmp and correct as needed    #Hypermagnesemia  -s/p 2g in ED, f/u mg level    #DVT PPx- SCD  #Diet- Renal  #CHG  #Activity- AAT  #Dispo- Home  #Code- FULL 44 Y M with pmh of CKD4 (follows with Dr. Luo, has not been compliant with follow-up), HTN (not on meds), HLD (not on meds) presents to ED with fatigue for 2 months and vomiting for last week. He says he has been so tired that he has had trouble getting out of bed for the last few months. NBNB vomiting for last week every morning. Found to have Cr of 19.7, anemic to 6.3 s/p 1 unit prbc in ED.    #HANS on CKD4  -nephrology consult Dr. Luo  -patient has very poor follow up with nephrologist  -will most likely need HD, not urgent currently though  -follow up nephrology recommendations  -check lytes, replete as needed   -check anti-DNAse b, DANIEL, urine lytes, UPEP  -troponin 0.02 on admission most likely 2/2 renal failure, trend    #Normocytic anemia- patient does not report any overt GI or  bleeding   -s/p 1 unit prbc in ED  -some microscopic hematuria on UA  -check iron studies  -keep active t/s    #Hypocalcemia  -not symptomatic  -check pth, vitamin d levels  -most likely 2/2 ckd  -give 1g calcium gluconate    #Hyperkalemia  -5.1 on admission, f/u bmp and correct as needed    #Hypermagnesemia  -s/p 2g in ED, f/u mg level    #Nausea/vomiting  #Increased QTc  -vomiting most likely due to uremia  -avoid qt prolonging meds; will order compazine prn for nausea/vomiting    #History of HTN and HLD; patient non-compliant with medications  -check lipid profile, hba1c    #DVT PPx- SCD  #Diet- Renal  #CHG  #Activity- AAT  #Dispo- Home  #Code- FULL

## 2020-07-07 NOTE — H&P ADULT - HISTORY OF PRESENT ILLNESS
44 Y M with pmh of CKD4 (follows with Dr. Luo, has not been compliant with follow-up), HTN (not on meds), HLD (not on meds) presents to ED with fatigue for 2 months and vomiting for last week. He says he has been so tired that he has had trouble getting out of bed for the last few months. This has been associated with severe headaches and vision changes, forcing him to lose his job as a . For the last week, he has had nausea and vomiting in the morning, NBNB about 1 time a day. Last vomited this morning. The retching has now caused chest pain that is only present when he vomits. He has also had diarrhea for last few weeks nearly every time he has a BM. Reports weight loss of about 5 pounds in last few months, which he contributes to decreased taste and poor appetite. Denies chest pain, shortness of breath, palpitations, abdominal pain, burning on urination, flank pain, musculoskeletal pain.     T 98.2F, P 90, /78, R20, S 100% on RA at rest. Labs significant for hemoglobin 6.3, Cr 19.7, calcium 5.0. He received 1 unit PRBC. Retroperitoneal US performed. Nephrology consulted.

## 2020-07-07 NOTE — H&P ADULT - NSHPSOCIALHISTORY_GEN_ALL_CORE
, lives with wife and two kids  worked as [employer]   never tobacco  very rarely alcohol  never drugs  denies STD history

## 2020-07-07 NOTE — H&P ADULT - NSHPPHYSICALEXAM_GEN_ALL_CORE
PHYSICAL EXAM:  GENERAL: NAD, lying in bed comfortably, appears ashen  HEAD:  Atraumatic, Normocephalic  EYES: EOMI, PERRLA, conjunctival pallor  ENT: Moist mucous membranes  NECK: Supple, No JVD  CHEST/LUNG: Clear to auscultation bilaterally; No rales, rhonchi, wheezing, or rubs. Unlabored respirations  HEART: Regular rate and rhythm; No murmurs, rubs, or gallops  ABDOMEN: Bowel sounds present; Soft, Nontender, Nondistended. No hepatomegaly. Appendectomy scar  EXTREMITIES:  2+ Peripheral Pulses, brisk capillary refill. No clubbing, cyanosis, or edema  NERVOUS SYSTEM:  Alert & Oriented X3, speech clear. No deficits   MSK: FROM all 4 extremities, full and equal strength  SKIN: No rashes or lesions

## 2020-07-07 NOTE — CONSULT NOTE ADULT - ATTENDING COMMENTS
above noted abdomen soft no distension neck no mass/infection discussed case with DR DIEGO for eloisa in am

## 2020-07-07 NOTE — H&P ADULT - NSHPREVIEWOFSYSTEMS_GEN_ALL_CORE
REVIEW OF SYSTEMS:    CONSTITUTIONAL: +fatigue  EYES/ENT: +blurry vision  NECK: No pain or stiffness  RESPIRATORY: No cough, wheezing, hemoptysis; No shortness of breath  CARDIOVASCULAR: No chest pain or palpitations  GASTROINTESTINAL: +nausea/vomiting  GENITOURINARY: No dysuria, frequency or hematuria  NEUROLOGICAL: No numbness or weakness  SKIN: No itching, rashes

## 2020-07-07 NOTE — CONSULT NOTE ADULT - SUBJECTIVE AND OBJECTIVE BOX
ANGEL MAC [id number]  44y Male    HPI:  44 Y M with pmh of CKD4 (follows with Dr. Luo, has not been compliant with follow-up), HTN (not on meds), HLD (not on meds) presents to ED with fatigue for 2 months and vomiting for last week. He says he has been so tired that he has had trouble getting out of bed for the last few months. This has been associated with severe headaches and vision changes, forcing him to lose his job as a . For the last week, he has had nausea and vomiting in the morning, NBNB about 1 time a day. Last vomited this morning. The retching has now caused chest pain that is only present when he vomits. He has also had diarrhea for last few weeks nearly every time he has a BM. Reports weight loss of about 5 pounds in last few months, which he contributes to decreased taste and poor appetite. Denies chest pain, shortness of breath, palpitations, abdominal pain, burning on urination, flank pain, musculoskeletal pain.   T 98.2F, P 90, /78, R20, S 100% on RA at rest. Labs significant for hemoglobin 6.3, Cr 19.7, calcium 5.0. He received 1 unit PRBC. Retroperitoneal US performed. Nephrology consulted. (2020 04:28)      PAST MEDICAL & SURGICAL HISTORY:  CKD (chronic kidney disease)  GERD (gastroesophageal reflux disease)  History of appendectomy    MEDICATIONS  (STANDING):  calcium acetate 2001 milliGRAM(s) Oral three times a day with meals  chlorhexidine 4% Liquid 1 Application(s) Topical <User Schedule>  pantoprazole    Tablet 40 milliGRAM(s) Oral before breakfast  sodium chloride 0.9%. 1000 milliLiter(s) (75 mL/Hr) IV Continuous <Continuous>    MEDICATIONS  (PRN):  prochlorperazine   Injectable 5 milliGRAM(s) IV Push four times a day PRN Nausea/vomiting    Allergies    No Known Allergies    Intolerances    REVIEW OF SYSTEMS    [x] A ten-point review of systems was otherwise negative except as noted.  [ ] Due to altered mental status/intubation, subjective information were not able to be obtained from the patient. History was obtained, to the extent possible, from review of the chart and collateral sources of information.      Vital Signs Last 24 Hrs  T(C): 36.1 (2020 14:16), Max: 37.1 (2020 08:08)  T(F): 96.9 (2020 14:16), Max: 98.8 (2020 08:08)  HR: 73 (2020 14:16) (59 - 73)  BP: 169/85 (2020 14:16) (138/77 - 169/85)  BP(mean): --  RR: 18 (2020 14:16) (18 - 20)  SpO2: 99% (2020 08:08) (98% - 100%)    PHYSICAL EXAM:  GENERAL: NAD, well-appearing  CHEST/LUNG: Clear to auscultation bilaterally  HEART: Regular rate and rhythm  ABDOMEN: Soft, Nontender, Nondistended;   EXTREMITIES:  No clubbing, cyanosis, or edema    LABS:  Labs:  CAPILLARY BLOOD GLUCOSE                          7.3    8.28  )-----------( 230      ( 2020 07:16 )             22.7       Auto Neutrophil %: 87.9 % (20 @ 18:34)  Auto Immature Granulocyte %: 0.4 % (20 @ 18:34)    07-    138  |  101  |  155<HH>  ----------------------------<  157<H>  4.8   |  11<L>  |  18.2<HH>      Calcium, Total Serum: 5.2 mg/dL (20 @ 07:16)      LFTs:             6.5  | 0.5  | <5       ------------------[54      ( 2020 07:16 )  4.3  | x    | <5          Lipase:x      Amylase:x         Lactate, Blood: 0.7 mmol/L (20 @ 18:34)      Coags:     12.90  ----< 1.12    ( 2020 18:34 )     27.7        CARDIAC MARKERS ( 2020 11:19 )  x     / 0.02 ng/mL / x     / x     / x      CARDIAC MARKERS ( 2020 18:34 )  x     / 0.02 ng/mL / x     / x     / x              Urinalysis Basic - ( 2020 20:00 )    Color: Colorless / Appearance: Clear / S.010 / pH: x  Gluc: x / Ketone: Negative  / Bili: Negative / Urobili: <2 mg/dL   Blood: x / Protein: 100 mg/dL / Nitrite: Negative   Leuk Esterase: Negative / RBC: 3 /HPF / WBC 3 /HPF   Sq Epi: x / Non Sq Epi: 0 /HPF / Bacteria: Negative        RADIOLOGY & ADDITIONAL STUDIES:

## 2020-07-07 NOTE — H&P ADULT - ATTENDING COMMENTS
HPI as above.  Interval history: Pt seen and examined at bedside. No cp or sob.  Pt still complaining of visual problems. Has had chronic diarrhea x 1 year.   Vital Signs (24 Hrs):  T(C): 36.1 (07-07-20 @ 14:16), Max: 37.1 (07-07-20 @ 08:08)  HR: 73 (07-07-20 @ 14:16) (59 - 90)  BP: 169/85 (07-07-20 @ 14:16) (138/77 - 169/85)  RR: 18 (07-07-20 @ 14:16) (18 - 20)  SpO2: 99% (07-07-20 @ 08:08) (98% - 100%)  Wt(kg): --  Daily     Daily     I&O's Summary    07 Jul 2020 07:01  -  07 Jul 2020 16:21  --------------------------------------------------------  IN: 0 mL / OUT: 200 mL / NET: -200 mL      PHYSICAL EXAM:  GENERAL: NAD, well-developed  HEAD:  Atraumatic, Normocephalic  EYES: EOMI, PERRLA, conjunctiva and sclera clear  NECK: Supple, No JVD  CHEST/LUNG: Clear to auscultation bilaterally; No wheeze  HEART: Regular rate and rhythm; No murmurs, rubs, or gallops  ABDOMEN: Soft, Nontender, Nondistended; Bowel sounds present  EXTREMITIES:  2+ Peripheral Pulses, No clubbing, cyanosis, or edema  PSYCH: AAOx3  NEUROLOGY: non-focal  SKIN: No rashes or lesions    Labs reviewed  Imaging reviewed: < from: Xray Chest 2 Views PA/Lat (07.06.20 @ 18:02) >    Impression:      No radiographic evidence of acute cardiopulmonary disease.    < end of copied text >      EKG reviewed: < from: 12 Lead ECG (07.06.20 @ 18:04) >    Diagnosis Line Normal sinus rhythm  Prolonged QT  Abnormal ECG    < end of copied text >    Plan  #Olivier on CKD4- likely advancement of CKD, now cr 19, baseline 2018 cr 7, has been non compliance     #Progress Note Handoff  Pending (specify):  Consults_________, Tests________, Test Results_______, Other_________  Family discussion:  Disposition: Home___/SNF___/Other________/Unknown at this time________ HPI as above.  Interval history: Pt seen and examined at bedside. No cp or sob.  Pt still complaining of visual problems. Has had chronic diarrhea x 1 year.   Vital Signs (24 Hrs):  T(C): 36.1 (07-07-20 @ 14:16), Max: 37.1 (07-07-20 @ 08:08)  HR: 73 (07-07-20 @ 14:16) (59 - 90)  BP: 169/85 (07-07-20 @ 14:16) (138/77 - 169/85)  RR: 18 (07-07-20 @ 14:16) (18 - 20)  SpO2: 99% (07-07-20 @ 08:08) (98% - 100%)  Wt(kg): --  Daily     Daily     I&O's Summary    07 Jul 2020 07:01  -  07 Jul 2020 16:21  --------------------------------------------------------  IN: 0 mL / OUT: 200 mL / NET: -200 mL      PHYSICAL EXAM:  GENERAL: NAD, well-developed  HEAD:  Atraumatic, Normocephalic  EYES: EOMI, PERRLA, conjunctiva and sclera clear  NECK: Supple, No JVD  CHEST/LUNG: Clear to auscultation bilaterally; No wheeze  HEART: Regular rate and rhythm; No murmurs, rubs, or gallops  ABDOMEN: Soft, Nontender, Nondistended; Bowel sounds present  EXTREMITIES:  2+ Peripheral Pulses, No clubbing, cyanosis, or edema  PSYCH: AAOx3  NEUROLOGY: non-focal  SKIN: No rashes or lesions    Labs reviewed  Imaging reviewed: < from: Xray Chest 2 Views PA/Lat (07.06.20 @ 18:02) >    Impression:      No radiographic evidence of acute cardiopulmonary disease.    < end of copied text >      EKG reviewed: < from: 12 Lead ECG (07.06.20 @ 18:04) >    Diagnosis Line Normal sinus rhythm  Prolonged QT  Abnormal ECG    < end of copied text >    Plan  #HANS on CKD4- likely advancement of CKD, now cr 19, baseline 2018 cr 7, has been non compliance, Plan to start hemodialysis by Nephro, Tesio by Surgery in am.   #medical clearance- Moderate risk   #troponemia 2/2 HANS- no chest pain, ekg nsr, stable x 2, and also possible ischemic demand 2/2 anemia   #Anemia of CKD-s/p transfuse 1 unit, monitor hgb, active TS   #rest as above     #Progress Note Handoff  Pending (specify):  tesio in am, npo in evening   Family discussion: ian pt and agreed to plan   Disposition: Home___/SNF___/Other________/Unknown at this time____x____ HPI as above.  Interval history: Pt seen and examined at bedside. No cp or sob.  Pt still complaining of visual problems. Has had chronic diarrhea x 1 year.   Vital Signs (24 Hrs):  T(C): 36.1 (07-07-20 @ 14:16), Max: 37.1 (07-07-20 @ 08:08)  HR: 73 (07-07-20 @ 14:16) (59 - 90)  BP: 169/85 (07-07-20 @ 14:16) (138/77 - 169/85)  RR: 18 (07-07-20 @ 14:16) (18 - 20)  SpO2: 99% (07-07-20 @ 08:08) (98% - 100%)  Wt(kg): --  Daily     Daily     I&O's Summary    07 Jul 2020 07:01  -  07 Jul 2020 16:21  --------------------------------------------------------  IN: 0 mL / OUT: 200 mL / NET: -200 mL      PHYSICAL EXAM:  GENERAL: NAD, well-developed  HEAD:  Atraumatic, Normocephalic  EYES: EOMI, PERRLA, conjunctiva and sclera clear  NECK: Supple, No JVD  CHEST/LUNG: Clear to auscultation bilaterally; No wheeze  HEART: Regular rate and rhythm; No murmurs, rubs, or gallops  ABDOMEN: Soft, Nontender, Nondistended; Bowel sounds present  EXTREMITIES:  2+ Peripheral Pulses, No clubbing, cyanosis, or edema  PSYCH: AAOx3  NEUROLOGY: non-focal  SKIN: No rashes or lesions    Labs reviewed  Imaging reviewed: < from: Xray Chest 2 Views PA/Lat (07.06.20 @ 18:02) >    Impression:      No radiographic evidence of acute cardiopulmonary disease.    < end of copied text >      EKG reviewed: < from: 12 Lead ECG (07.06.20 @ 18:04) >    Diagnosis Line Normal sinus rhythm  Prolonged QT  Abnormal ECG    < end of copied text >    Plan  #HANS on CKD4- symptoms likely advancement of CKD, now cr 19, baseline 2018 cr 7, has been non compliance, Plan to start hemodialysis by Nephro, Tesio by Surgery in am.   #medical clearance- Moderate risk   #troponemia 2/2 HANS- no chest pain, ekg nsr, stable x 2, and also possible ischemic demand 2/2 anemia   #Anemia of CKD-s/p transfuse 1 unit, monitor hgb, active TS   #visual changes-HCT  #rest as above     #Progress Note Handoff  Pending (specify):  tesio in am, npo in evening   Family discussion: ian pt and agreed to plan   Disposition: Home___/SNF___/Other________/Unknown at this time____x____

## 2020-07-07 NOTE — H&P ADULT - NSHPLABSRESULTS_GEN_ALL_CORE
139  |  99  |  157<HH>  ----------------------------<  121<H>  5.1<H>   |  10<L>  |  19.7<HH>    Ca    5.0<LL>      2020 18:34  Mg     1.7         TPro  7.0  /  Alb  4.6  /  TBili  0.5  /  DBili  x   /  AST  5   /  ALT  5   /  AlkPhos  56      CBC Full  -  ( 2020 18:34 )  WBC Count : 7.80 K/uL  RBC Count : 2.03 M/uL  Hemoglobin : 6.3 g/dL  Hematocrit : 19.3 %  Platelet Count - Automated : 235 K/uL  Mean Cell Volume : 95.1 fL  Mean Cell Hemoglobin : 31.0 pg  Mean Cell Hemoglobin Concentration : 32.6 g/dL  Auto Neutrophil # : 6.86 K/uL  Auto Lymphocyte # : 0.43 K/uL  Auto Monocyte # : 0.33 K/uL  Auto Eosinophil # : 0.13 K/uL  Auto Basophil # : 0.02 K/uL  Auto Neutrophil % : 87.9 %  Auto Lymphocyte % : 5.5 %  Auto Monocyte % : 4.2 %  Auto Eosinophil % : 1.7 %  Auto Basophil % : 0.3 %    Lipase, Serum: 88 U/L (20 @ 18:34)    PT/INR - ( 2020 18:34 )   PT: 12.90 sec;   INR: 1.12 ratio         PTT - ( 2020 18:34 )  PTT:27.7 sec    Troponin T, Serum: 0.02 ng/mL (20 @ 18:34)    Urinalysis Basic - ( 2020 20:00 )    Color: Colorless / Appearance: Clear / S.010 / pH: x  Gluc: x / Ketone: Negative  / Bili: Negative / Urobili: <2 mg/dL   Blood: x / Protein: 100 mg/dL / Nitrite: Negative   Leuk Esterase: Negative / RBC: 3 /HPF / WBC 3 /HPF   Sq Epi: x / Non Sq Epi: 0 /HPF / Bacteria: Negative      < from: 12 Lead ECG (20 @ 18:04) >    Diagnosis Line Normal sinus rhythm  Prolonged QT  Abnormal ECG    < end of copied text >    < from: US Retroperitoneal Complete (20 @ 22:57) >    IMPRESSION:  Increased bilateral renal parenchymal echogenicity, which may be attributed to medical renal disease.  No hydronephrosis.  Urinary bladder nondistended. Prostate gland not delineated.           < end of copied text >

## 2020-07-07 NOTE — CHART NOTE - NSCHARTNOTEFT_GEN_A_CORE
PRE-OPERATIVE NOTE    Patient: ANGEL MAC  MRN: [id number]  44y Male  Location: [facility] F1-4A Rehab 011 A  20 @ 17:40    Vitals:  T(F): 96.9 (20 @ 14:16), Max: 98.8 (20 @ 08:08)  HR: 73 (20 @ 14:16) (59 - 73)  BP: 169/85 (20 @ 14:16) (138/77 - 169/85)  RR: 18 (20 @ 14:16) (18 - 20)  SpO2: 99% (20 @ 08:08) (98% - 100%)    Procedure:   Consent in Chart: [  ] Yes [  ] No  Diet: NPO after midnight. Please avoid eating any foods that are high in fat/grease, or foods that make you gassy or cause you to experience GI upset, including spicy foods for 2-3 weeks after your procedure.  Fluids: calcium acetate 2001 milliGRAM(s) Oral three times a day with meals  sodium chloride 0.9%. 1000 milliLiter(s) (75 mL/Hr) IV Continuous <Continuous>    EK Lead ECG:   Ventricular Rate 66 BPM  Atrial Rate 66 BPM  P-R Interval 140 ms  QRS Duration 98 ms  Q-T Interval 480 ms  QTC Calculation(Bezet) 503 ms  P Axis 78 degrees  R Axis 70 degrees  T Axis 68 degrees    Diagnosis Line Normal sinus rhythm  Prolonged QT  Abnormal ECG      Confirmed by ROSENDO GEIGER MD ([id number]) on 2020 10:53:57 PM (20 @ 18:04)  CXR:  Xray Chest 2 Views PA/Lat:   EXAM:  XR CHEST PA LAT 2V        PROCEDURE DATE:  2020    INTERPRETATION:  Clinical History / Reason for exam: Chest pain    Comparison : Chest radiograph None.  Technique/Positioning: PA and lateral views of the chest.  Findings:  Support devices: None.  Cardiac/mediastinum/hilum: Unremarkable.  Lung parenchyma/Pleura: No consolidation, effusion or pneumothorax.  Skeleton/soft tissues: Unremarkable.    Impression:    No radiographic evidence of acute cardiopulmonary disease    ELLIOT LANDAU M.D., ATTENDING RADIOLOGIST  This document has been electronically signed. 2020  8:05AM             (07-06-20 @ 18:02)    Pre-OP Labs:  CAPILLARY BLOOD GLUCOSE                  7.3    8.28  )-----------( 230      ( 2020 07:16 )             22.7     07-07    138  |  101  |  155<HH>  ----------------------------<  157<H>  4.8   |  11<L>  |  18.2<HH>    Ca    5.2<LL>      2020 07:16  Phos  11.6     07-  Mg     2.2     07-    TPro  6.5  /  Alb  4.3  /  TBili  0.5  /  DBili  x   /  AST  <5  /  ALT  <5  /  AlkPhos  54  07-07    PT/INR - ( 2020 18:34 )   PT: 12.90 sec;   INR: 1.12 ratio         PTT - ( 2020 18:34 )  PTT:27.7 sec  Urinalysis Basic - ( 2020 20:00 )    Color: Colorless / Appearance: Clear / S.010 / pH: x  Gluc: x / Ketone: Negative  / Bili: Negative / Urobili: <2 mg/dL   Blood: x / Protein: 100 mg/dL / Nitrite: Negative   Leuk Esterase: Negative / RBC: 3 /HPF / WBC 3 /HPF   Sq Epi: x / Non Sq Epi: 0 /HPF / Bacteria: Negative    Type & Screen: x2  &     Anticoagulation/Antiplatelet: HELD PRE-OPERATIVE NOTE    Patient: ANGEL MAC  MRN: [id number]  44y Male  Location: [address] 011 A  20 @ 17:40    Vitals:  T(F): 96.9 (20 @ 14:16), Max: 98.8 (20 @ 08:08)  HR: 73 (20 @ 14:16) (59 - 73)  BP: 169/85 (20 @ 14:16) (138/77 - 169/85)  RR: 18 (20 @ 14:16) (18 - 20)  SpO2: 99% (20 @ 08:08) (98% - 100%)    Procedure:   Consent in Chart: [ x ] Yes  Diet: NPO after midnight. Please avoid eating any foods that are high in fat/grease, or foods that make you gassy or cause you to experience GI upset, including spicy foods for 2-3 weeks after your procedure.  Fluids: calcium acetate 2001 milliGRAM(s) Oral three times a day with meals  sodium chloride 0.9%. 1000 milliLiter(s) (75 mL/Hr) IV Continuous <Continuous>    EK Lead ECG:   Ventricular Rate 66 BPM  Atrial Rate 66 BPM  P-R Interval 140 ms  QRS Duration 98 ms  Q-T Interval 480 ms  QTC Calculation(Bezet) 503 ms  P Axis 78 degrees  R Axis 70 degrees  T Axis 68 degrees    Diagnosis Line Normal sinus rhythm  Prolonged QT  Abnormal ECG      Confirmed by ROSENDO GEIGER MD ([id number]) on 2020 10:53:57 PM (20 @ 18:04)  CXR:  Xray Chest 2 Views PA/Lat:   EXAM:  XR CHEST PA LAT 2V        PROCEDURE DATE:  2020    INTERPRETATION:  Clinical History / Reason for exam: Chest pain    Comparison : Chest radiograph None.  Technique/Positioning: PA and lateral views of the chest.  Findings:  Support devices: None.  Cardiac/mediastinum/hilum: Unremarkable.  Lung parenchyma/Pleura: No consolidation, effusion or pneumothorax.  Skeleton/soft tissues: Unremarkable.    Impression:    No radiographic evidence of acute cardiopulmonary disease    ELLIOT LANDAU M.D., ATTENDING RADIOLOGIST  This document has been electronically signed. 2020  8:05AM             (20 @ 18:02)    Pre-OP Labs:  CAPILLARY BLOOD GLUCOSE                  7.3    8.28  )-----------( 230      ( 2020 07:16 )             22.7     07-07    138  |  101  |  155<HH>  ----------------------------<  157<H>  4.8   |  11<L>  |  18.2<HH>    Ca    5.2<LL>      2020 07:16  Phos  11.6     07-  Mg     2.2     07-07    TPro  6.5  /  Alb  4.3  /  TBili  0.5  /  DBili  x   /  AST  <5  /  ALT  <5  /  AlkPhos  54  07-07    PT/INR - ( 2020 18:34 )   PT: 12.90 sec;   INR: 1.12 ratio         PTT - ( 2020 18:34 )  PTT:27.7 sec  Urinalysis Basic - ( 2020 20:00 )    Color: Colorless / Appearance: Clear / S.010 / pH: x  Gluc: x / Ketone: Negative  / Bili: Negative / Urobili: <2 mg/dL   Blood: x / Protein: 100 mg/dL / Nitrite: Negative   Leuk Esterase: Negative / RBC: 3 /HPF / WBC 3 /HPF   Sq Epi: x / Non Sq Epi: 0 /HPF / Bacteria: Negative    Type & Screen: x2  &     Anticoagulation/Antiplatelet: HELD

## 2020-07-07 NOTE — CONSULT NOTE ADULT - ASSESSMENT
1. Nausea/vomiting. Likely uremic gastritis. Add PPI.  2. CKD V. Likely ESRD at this point. Consult Dr Elliott for tunneled catheter placement (no AVF yet because patient contemplating hemodialysis vs peritoneal dialysis). Will start HD once tunneled catheter placed. Outpatient dialysis will need to be arranged at [facility] [phone].  3. Anemia of CKD. Will start EPO with HD.  4. Hypocalcemia. Will add Hectorol with HD.  5. Hyperphosphatemia. Add sevelamer with meals. Renal diet.

## 2020-07-07 NOTE — CONSULT NOTE ADULT - SUBJECTIVE AND OBJECTIVE BOX
[facility] INITIAL CONSULT NOTE  --------------------------------------------------------------------------------  HPI:  44 Y M with pmh of obstructive uropathy/CKD V - not on dialysis (has not been compliant with nephrological follow-up), HTN (not on meds), HLD (not on meds) presents to ED with fatigue for 2 months and vomiting for last week. He says he has been so tired that he has had trouble getting out of bed for the last few months. This has been associated with severe headaches and vision changes, forcing him to lose his job as a . For the last week, he has had nausea and vomiting in the morning, NBNB about 1 time a day. Last vomited this morning. The retching has now caused chest pain that is only present when he vomits. He has also had diarrhea for last few weeks nearly every time he has a BM. Reports weight loss of about 5 pounds in last few months, which he contributes to decreased taste and poor appetite. Labs significant for hemoglobin 6.3, Cr 19.7, calcium 5.0. He received 1 unit PRBC. Retroperitoneal US without acute pathology. Nephrology consulted to assess need for renal replacement therapy.     PAST HISTORY  --------------------------------------------------------------------------------  PAST MEDICAL & SURGICAL HISTORY:  CKD (chronic kidney disease) V  GERD (gastroesophageal reflux disease)  History of appendectomy    FAMILY HISTORY:  FH: hypertension: father  FH: kidney disease: grandmother    SOCIAL HISTORY:  Denies current ETOH, tobacco, and illicit drug use    ALLERGIES & MEDICATIONS  --------------------------------------------------------------------------------  Allergies    No Known Allergies    Standing Inpatient Medications  chlorhexidine 4% Liquid 1 Application(s) Topical <User Schedule>  sodium chloride 0.9%. 1000 milliLiter(s) IV Continuous <Continuous>    PRN Inpatient Medications  prochlorperazine   Injectable 5 milliGRAM(s) IntraMuscular every 6 hours PRN    REVIEW OF SYSTEMS  --------------------------------------------------------------------------------  Gen: No fevers/chills  Skin: No rashes  Head/Eyes/Ears/Mouth: No sinus pain/discomfort, sore throat  Respiratory: No dyspnea, cough, wheezing, hemoptysis  CV: No chest pain, PND, orthopnea  : No increased frequency, dysuria, hematuria, nocturia  MSK: No joint pain/swelling; no back pain; no edema  All other systems were reviewed and are negative, except as noted.    VITALS/PHYSICAL EXAM  --------------------------------------------------------------------------------  T(C): 37.1 (07-07-20 @ 08:08), Max: 37.1 (07-07-20 @ 08:08)  HR: 71 (07-07-20 @ 08:08) (59 - 90)  BP: 151/79 (07-07-20 @ 08:08) (138/77 - 155/86)  RR: 18 (07-07-20 @ 08:08) (18 - 20)  SpO2: 99% (07-07-20 @ 08:08) (98% - 100%)  Weight (kg): 59.9 (07-06-20 @ 16:53)    07-07-20 @ 07:01  -  07-07-20 @ 11:09  --------------------------------------------------------  IN: 0 mL / OUT: 200 mL / NET: -200 mL    Physical Exam:  	Gen: NAD  	HEENT: Supple neck, clear oropharynx  	Pulm: CTA B/L  	CV: RRR, S1S2  	Back: No CVA tenderness; no sacral edema  	Abd: +BS, soft, nontender/nondistended  	: No suprapubic tenderness  	LE: Warm, no edema  	Neuro: AAOx3    LABS/STUDIES  --------------------------------------------------------------------------------              7.3    8.28  >-----------<  230      [07-07-20 @ 07:16]              22.7     138  |  101  |  155  ----------------------------<  157      [07-07-20 @ 07:16]  4.8   |  11  |  18.2        Ca     5.2     [07-07-20 @ 07:16]      Mg     2.2     [07-07-20 @ 07:16]      Phos  11.6     [07-07-20 @ 07:16]    TPro  6.5  /  Alb  4.3  /  TBili  0.5  /  DBili  x   /  AST  <5  /  ALT  <5  /  AlkPhos  54  [07-07-20 @ 07:16]    PT/INR: PT 12.90, INR 1.12       [07-06-20 @ 18:34]  PTT: 27.7       [07-06-20 @ 18:34]    Troponin 0.02      [07-06-20 @ 18:34]    Creatinine Trend:  SCr 18.2 [07-07 @ 07:16]  SCr 19.7 [07-06 @ 18:34]    Urinalysis - [07-06-20 @ 20:00]      Color Colorless / Appearance Clear / SG 1.010 / pH 6.0      Gluc Negative / Ketone Negative  / Bili Negative / Urobili <2 mg/dL       Blood Small / Protein 100 mg/dL / Leuk Est Negative / Nitrite Negative      RBC 3 / WBC 3 / Hyaline 0 / Gran  / Sq Epi  / Non Sq Epi 0 / Bacteria Negative    Iron 151, TIBC 213, %sat 71      [07-07-20 @ 07:16]

## 2020-07-07 NOTE — CONSULT NOTE ADULT - ASSESSMENT
A/P:  ANGEL MAC is a 44y year old Male admitted for ESRD ANEMIA, requiring dialysis. Surgery consulted for placement of tesio catheter.    Plan:  NPO @ MN  Pt added on to schedule for tomorrow   Hold AC  Transfuse patient x1  Medical clearance  Pre-op Labs: cbc, bmp mg, po,  pt/ptt/inr, T&S  Consent in chart  Plan discussed with pt. All questions answered.    Thank you for this consultation and allowing us to participate in your patient's care.

## 2020-07-08 LAB
ANA TITR SER: NEGATIVE — SIGNIFICANT CHANGE UP
CALCIUM SERPL-MCNC: 5.7 MG/DL — CRITICAL LOW (ref 8.4–10.5)
CREATININE, URINE RESULT: 57 MG/DL — SIGNIFICANT CHANGE UP
HAV IGM SER-ACNC: SIGNIFICANT CHANGE UP
HBV CORE IGM SER-ACNC: SIGNIFICANT CHANGE UP
HBV SURFACE AB SER-ACNC: SIGNIFICANT CHANGE UP
HBV SURFACE AG SER-ACNC: SIGNIFICANT CHANGE UP
HCT VFR BLD CALC: 21.9 % — LOW (ref 42–52)
HCV AB S/CO SERPL IA: 0.07 S/CO — SIGNIFICANT CHANGE UP (ref 0–0.99)
HCV AB SERPL-IMP: SIGNIFICANT CHANGE UP
HGB BLD-MCNC: 7.1 G/DL — LOW (ref 14–18)
MCHC RBC-ENTMCNC: 30.3 PG — SIGNIFICANT CHANGE UP (ref 27–31)
MCHC RBC-ENTMCNC: 32.4 G/DL — SIGNIFICANT CHANGE UP (ref 32–37)
MCV RBC AUTO: 93.6 FL — SIGNIFICANT CHANGE UP (ref 80–94)
NRBC # BLD: 0 /100 WBCS — SIGNIFICANT CHANGE UP (ref 0–0)
PLATELET # BLD AUTO: 166 K/UL — SIGNIFICANT CHANGE UP (ref 130–400)
PROT ?TM UR-MCNC: 164 MG/DL — HIGH (ref 0–12)
PTH-INTACT FLD-MCNC: 703 PG/ML — HIGH (ref 15–65)
RBC # BLD: 2.34 M/UL — LOW (ref 4.7–6.1)
RBC # FLD: 13.6 % — SIGNIFICANT CHANGE UP (ref 11.5–14.5)
WBC # BLD: 7.14 K/UL — SIGNIFICANT CHANGE UP (ref 4.8–10.8)
WBC # FLD AUTO: 7.14 K/UL — SIGNIFICANT CHANGE UP (ref 4.8–10.8)

## 2020-07-08 PROCEDURE — 99233 SBSQ HOSP IP/OBS HIGH 50: CPT

## 2020-07-08 PROCEDURE — 71045 X-RAY EXAM CHEST 1 VIEW: CPT | Mod: 26

## 2020-07-08 RX ORDER — DOXERCALCIFEROL 2.5 UG/1
4 CAPSULE ORAL
Refills: 0 | Status: DISCONTINUED | OUTPATIENT
Start: 2020-07-08 | End: 2020-07-10

## 2020-07-08 RX ORDER — CALCIUM ACETATE 667 MG
2001 TABLET ORAL
Refills: 0 | Status: DISCONTINUED | OUTPATIENT
Start: 2020-07-08 | End: 2020-07-10

## 2020-07-08 RX ORDER — OXYCODONE AND ACETAMINOPHEN 5; 325 MG/1; MG/1
2 TABLET ORAL ONCE
Refills: 0 | Status: DISCONTINUED | OUTPATIENT
Start: 2020-07-08 | End: 2020-07-08

## 2020-07-08 RX ORDER — HEPARIN SODIUM 5000 [USP'U]/ML
5000 INJECTION INTRAVENOUS; SUBCUTANEOUS EVERY 12 HOURS
Refills: 0 | Status: DISCONTINUED | OUTPATIENT
Start: 2020-07-08 | End: 2020-07-10

## 2020-07-08 RX ORDER — PANTOPRAZOLE SODIUM 20 MG/1
40 TABLET, DELAYED RELEASE ORAL
Refills: 0 | Status: DISCONTINUED | OUTPATIENT
Start: 2020-07-08 | End: 2020-07-10

## 2020-07-08 RX ORDER — CALCIUM ACETATE 667 MG
2001 TABLET ORAL
Refills: 0 | Status: DISCONTINUED | OUTPATIENT
Start: 2020-07-08 | End: 2020-07-08

## 2020-07-08 RX ORDER — PROCHLORPERAZINE MALEATE 5 MG
5 TABLET ORAL
Refills: 0 | Status: DISCONTINUED | OUTPATIENT
Start: 2020-07-08 | End: 2020-07-10

## 2020-07-08 RX ORDER — ONDANSETRON 8 MG/1
4 TABLET, FILM COATED ORAL ONCE
Refills: 0 | Status: DISCONTINUED | OUTPATIENT
Start: 2020-07-08 | End: 2020-07-08

## 2020-07-08 RX ORDER — SODIUM CHLORIDE 9 MG/ML
1000 INJECTION INTRAMUSCULAR; INTRAVENOUS; SUBCUTANEOUS
Refills: 0 | Status: DISCONTINUED | OUTPATIENT
Start: 2020-07-08 | End: 2020-07-08

## 2020-07-08 RX ORDER — MORPHINE SULFATE 50 MG/1
2 CAPSULE, EXTENDED RELEASE ORAL
Refills: 0 | Status: DISCONTINUED | OUTPATIENT
Start: 2020-07-08 | End: 2020-07-08

## 2020-07-08 RX ORDER — ERYTHROPOIETIN 10000 [IU]/ML
10000 INJECTION, SOLUTION INTRAVENOUS; SUBCUTANEOUS
Refills: 0 | Status: DISCONTINUED | OUTPATIENT
Start: 2020-07-08 | End: 2020-07-10

## 2020-07-08 RX ADMIN — PANTOPRAZOLE SODIUM 40 MILLIGRAM(S): 20 TABLET, DELAYED RELEASE ORAL at 13:15

## 2020-07-08 RX ADMIN — Medication 2001 MILLIGRAM(S): at 17:04

## 2020-07-08 RX ADMIN — MORPHINE SULFATE 2 MILLIGRAM(S): 50 CAPSULE, EXTENDED RELEASE ORAL at 08:51

## 2020-07-08 RX ADMIN — MORPHINE SULFATE 2 MILLIGRAM(S): 50 CAPSULE, EXTENDED RELEASE ORAL at 08:35

## 2020-07-08 RX ADMIN — Medication 2001 MILLIGRAM(S): at 13:16

## 2020-07-08 RX ADMIN — HEPARIN SODIUM 5000 UNIT(S): 5000 INJECTION INTRAVENOUS; SUBCUTANEOUS at 17:03

## 2020-07-08 RX ADMIN — Medication 5 MILLIGRAM(S): at 14:18

## 2020-07-08 RX ADMIN — ERYTHROPOIETIN 10000 UNIT(S): 10000 INJECTION, SOLUTION INTRAVENOUS; SUBCUTANEOUS at 11:04

## 2020-07-08 RX ADMIN — OXYCODONE AND ACETAMINOPHEN 2 TABLET(S): 5; 325 TABLET ORAL at 09:05

## 2020-07-08 RX ADMIN — DOXERCALCIFEROL 4 MICROGRAM(S): 2.5 CAPSULE ORAL at 11:04

## 2020-07-08 NOTE — CHART NOTE - NSCHARTNOTEFT_GEN_A_CORE
Post Operative Check    Patient is post op from a Insertion, tunneled central venous cath w/o port and is hemodynamically stable, s/p HD after procedure. Initially he had no N/V and was able to tolerate a diet, however approximately 5 hours post operatively he had one episode of vomiting NBNB, with no nausea. His pain is well controlled and localized to the incision. No dysphagia or dysphonia. No SOB/CP. No palpitations. No weakness/headache. Tolerating diet, ambulatory and urinating appropriately.     Vitals    T(C): 36.3 (20 @ 14:00), Max: 36.7 (20 @ 05:00)  HR: 72 (20 @ 14:00) (69 - 86)  BP: 143/88 (20 @ 14:00) (120/65 - 169/81)  RR: 18 (20 @ 14:00) (15 - 18)  SpO2: 100% (20 @ 09:15) (98% - 100%)  Wt(kg): --       @ 07:01  -   @ 07:00  --------------------------------------------------------  IN:  Total IN: 0 mL    OUT:    Voided: 900 mL  Total OUT: 900 mL    Total NET: -900 mL       @ 07:  -   @ 14:44  --------------------------------------------------------  IN:    Oral Fluid: 300 mL    sodium chloride 0.9%: 30 mL  Total IN: 330 mL    OUT:    Emesis: 80 mL    Other: 200 mL  Total OUT: 280 mL    Total NET: 50 mL      Physical Exam  General: NAD AAOx3   Neck: Incision dressings are clean, dry and intact. No hematoma or erythema visualized. Nontender to palpation  Cards: RRR S1S2  Resp: CTAB  Abdomen: Soft non distended, non tender      Labs  Labs:  CAPILLARY BLOOD GLUCOSE                          6.9    8.46  )-----------( 197      ( 2020 20:57 )             21.5       Auto Neutrophil %: 85.8 % (20 @ 20:57)  Auto Immature Granulocyte %: 0.5 % (20 @ 20:57)        142  |  104  |  148<HH>  ----------------------------<  110<H>  4.9   |  11<L>  |  18.9<HH>    Calcium, Total Serum: 5.9 mg/dL (20 @ 20:57)  LFTs:             6.5  | 0.5  | <5       ------------------[54      ( 2020 07:16 )  4.3  | x    | <5          Lactate, Blood: 0.7 mmol/L (20 @ 18:34)    Coags:     12.90  ----< 1.12    ( 2020 18:34 )     27.7        CARDIAC MARKERS ( 2020 11:19 )  x     / 0.02 ng/mL / x     / x     / x      CARDIAC MARKERS ( 2020 18:34 )  x     / 0.02 ng/mL / x     / x     / x          Urinalysis Basic - ( 2020 18:01 )    Color: Colorless / Appearance: Clear / S.010 / pH: x  Gluc: x / Ketone: Negative  / Bili: Negative / Urobili: <2 mg/dL   Blood: x / Protein: 100 mg/dL / Nitrite: Negative   Leuk Esterase: Negative / RBC: 1 /HPF / WBC 1 /HPF   Sq Epi: x / Non Sq Epi: 0 /HPF / Bacteria: Negative      Radiology:     Post-operative CXR:  No radiographic evidence of acute cardiopulmonary disease.  New right-sided dialysis catheter as above.      Patient is a 44y old Male s/p insertion of Tesio tunneled catheter s/p HD using the tunneled catheter  Plan:  - POD 1 neck exam for hematoma  - c/w hemodynamic monitoring  - pain control as needed  - c/w renal diet  - monitor N/V  - Use tesio tunneled catheter as needed  - dvt/gi ppx

## 2020-07-08 NOTE — BRIEF OPERATIVE NOTE - COMMENTS
May go to the medical beard following discharge from PACU. Will need confirmatory X-ray while in PACU prior to usage.

## 2020-07-08 NOTE — CHART NOTE - NSCHARTNOTEFT_GEN_A_CORE
Post Operative Check    Patient is post op from a Insertion, tunneled central venous cath w/o port and is hemodynamically stable, s/p HD after procedure. Initially he had no N/V and was able to tolerate a diet, however approximately 5 hours post operatively he had one episode of vomiting NBNB, with no nausea. His pain is well controlled and localized to the incision. No dysphagia or dysphonia. No SOB/CP. No palpitations. No weakness/headache. Tolerating diet, ambulatory and urinating appropriately.     Vitals    T(C): 36.3 (20 @ 14:00), Max: 36.7 (20 @ 05:00)  HR: 72 (20 @ 14:00) (69 - 86)  BP: 143/88 (20 @ 14:00) (120/65 - 169/81)  RR: 18 (20 @ 14:00) (15 - 18)  SpO2: 100% (20 @ 09:15) (98% - 100%)  Wt(kg): --       @ 07:01  -   @ 07:00  --------------------------------------------------------  IN:  Total IN: 0 mL    OUT:    Voided: 900 mL  Total OUT: 900 mL    Total NET: -900 mL       @ 07:  -   @ 14:44  --------------------------------------------------------  IN:    Oral Fluid: 300 mL    sodium chloride 0.9%: 30 mL  Total IN: 330 mL    OUT:    Emesis: 80 mL    Other: 200 mL  Total OUT: 280 mL    Total NET: 50 mL      Physical Exam  General: NAD AAOx3   Neck: Incision dressings are clean, dry and intact. No hematoma or erythema visualized. Nontender to palpation  Cards: RRR S1S2  Resp: CTAB  Abdomen: Soft non distended, non tender      Labs  Labs:  CAPILLARY BLOOD GLUCOSE                          6.9    8.46  )-----------( 197      ( 2020 20:57 )             21.5       Auto Neutrophil %: 85.8 % (20 @ 20:57)  Auto Immature Granulocyte %: 0.5 % (20 @ 20:57)        142  |  104  |  148<HH>  ----------------------------<  110<H>  4.9   |  11<L>  |  18.9<HH>    Calcium, Total Serum: 5.9 mg/dL (20 @ 20:57)  LFTs:             6.5  | 0.5  | <5       ------------------[54      ( 2020 07:16 )  4.3  | x    | <5          Lactate, Blood: 0.7 mmol/L (20 @ 18:34)    Coags:     12.90  ----< 1.12    ( 2020 18:34 )     27.7        CARDIAC MARKERS ( 2020 11:19 )  x     / 0.02 ng/mL / x     / x     / x      CARDIAC MARKERS ( 2020 18:34 )  x     / 0.02 ng/mL / x     / x     / x          Urinalysis Basic - ( 2020 18:01 )    Color: Colorless / Appearance: Clear / S.010 / pH: x  Gluc: x / Ketone: Negative  / Bili: Negative / Urobili: <2 mg/dL   Blood: x / Protein: 100 mg/dL / Nitrite: Negative   Leuk Esterase: Negative / RBC: 1 /HPF / WBC 1 /HPF   Sq Epi: x / Non Sq Epi: 0 /HPF / Bacteria: Negative      Radiology:     Post-operative CXR:  No radiographic evidence of acute cardiopulmonary disease.  New right-sided dialysis catheter as above.      Patient is a 44y old Male s/p insertion of Tesio tunneled catheter s/p HD using the tunneled catheter  Plan:  - c/w hemodynamic monitoring  - pain control as needed  - POD 1 neck exam for hematoma  - monitor N/V  - Tesio catheter: use as needed

## 2020-07-08 NOTE — PROGRESS NOTE ADULT - SUBJECTIVE AND OBJECTIVE BOX
[facility] NEPHROLOGY FOLLOW UP NOTE  --------------------------------------------------------------------------------  24 hour events/subjective: Patient examined during HD. Appears comfortable.    PAST HISTORY  --------------------------------------------------------------------------------  No significant changes to PMH, PSH, FHx, SHx, unless otherwise noted    ALLERGIES & MEDICATIONS  --------------------------------------------------------------------------------  Allergies    No Known Allergies    Standing Inpatient Medications  calcium acetate 2001 milliGRAM(s) Oral three times a day with meals  doxercalciferol Injectable 4 MICROGram(s) IV Push <User Schedule>  epoetin karen-epbx (RETACRIT) Injectable 45970 Unit(s) IV Push <User Schedule>  heparin   Injectable 5000 Unit(s) SubCutaneous every 12 hours  pantoprazole    Tablet 40 milliGRAM(s) Oral before breakfast    PRN Inpatient Medications  prochlorperazine   Injectable 5 milliGRAM(s) IV Push four times a day PRN    VITALS/PHYSICAL EXAM  --------------------------------------------------------------------------------  T(C): 36.6 (07-08-20 @ 09:00), Max: 36.7 (07-08-20 @ 05:00)  HR: 71 (07-08-20 @ 09:55) (69 - 75)  BP: 152/96 (07-08-20 @ 09:55) (120/69 - 169/85)  RR: 16 (07-08-20 @ 09:55) (15 - 18)  SpO2: 100% (07-08-20 @ 09:15) (98% - 100%)  Height (cm): 170.18 (07-08-20 @ 07:19)  Weight (kg): 59 (07-08-20 @ 07:19)  BMI (kg/m2): 20.4 (07-08-20 @ 07:19)  BSA (m2): 1.68 (07-08-20 @ 07:19)    07-07-20 @ 07:01  -  07-08-20 @ 07:00  --------------------------------------------------------  IN: 0 mL / OUT: 900 mL / NET: -900 mL    07-08-20 @ 07:01  -  07-08-20 @ 11:54  --------------------------------------------------------  IN: 330 mL / OUT: 200 mL / NET: 130 mL    Physical Exam:  	Gen: NAD  	Pulm: CTA B/L  	CV: RRR, S1S2  	Abd: +BS, soft, nontender/nondistended  	: No suprapubic tenderness  	LE: Warm, no edema  	Vascular access: [facility]    LABS/STUDIES  --------------------------------------------------------------------------------              6.9    8.46  >-----------<  197      [07-07-20 @ 20:57]              21.5     142  |  104  |  148  ----------------------------<  110      [07-07-20 @ 20:57]  4.9   |  11  |  18.9        Ca     5.9     [07-07-20 @ 20:57]      Mg     2.3     [07-07-20 @ 20:57]      Phos  11.1     [07-07-20 @ 20:57]    TPro  6.5  /  Alb  4.3  /  TBili  0.5  /  DBili  x   /  AST  <5  /  ALT  <5  /  AlkPhos  54  [07-07-20 @ 07:16]    PT/INR: PT 12.90, INR 1.12       [07-06-20 @ 18:34]  PTT: 27.7       [07-06-20 @ 18:34]    Troponin 0.02      [07-07-20 @ 11:19]    Creatinine Trend:  SCr 18.9 [07-07 @ 20:57]  SCr 18.2 [07-07 @ 07:16]  SCr 19.7 [07-06 @ 18:34]    Urinalysis - [07-07-20 @ 18:01]      Color Colorless / Appearance Clear / SG 1.010 / pH 6.0      Gluc Negative / Ketone Negative  / Bili Negative / Urobili <2 mg/dL       Blood Small / Protein 100 mg/dL / Leuk Est Negative / Nitrite Negative      RBC 1 / WBC 1 / Hyaline 0 / Gran  / Sq Epi  / Non Sq Epi 0 / Bacteria Negative    Urine Creatinine 57      [07-07-20 @ 18:01]  Urine Protein 164      [07-07-20 @ 18:01]  Urine Sodium 77.0      [07-07-20 @ 18:01]  Urine Osmolality 303      [07-07-20 @ 18:01]    Iron 151, TIBC 213, %sat 71      [07-07-20 @ 07:16]  Ferritin 199      [07-07-20 @ 07:16]  PTH -- (Ca 5.7)      [07-07-20 @ 11:19]   703  Vitamin D (25OH) 30      [07-07-20 @ 11:19]  Lipid: chol 151, , HDL 29,       [07-07-20 @ 11:19]

## 2020-07-08 NOTE — PROGRESS NOTE ADULT - SUBJECTIVE AND OBJECTIVE BOX
ANGEL HAYWOODUEVA   [id number]  44y   Male PMHx of ESRD ANEMIA  ^MED EVAL      SUBJECTIVE:    Patient is a 44y old Male who presents with a chief complaint of vomiting, fatigue (2020 11:54)    Today is hospital day 2d. This morning he is resting comfortably in bed and reports no new issues or overnight events.   Currently admitted to medicine with the primary diagnosis of ESRD (end stage renal disease)       ROS:   GENERAL WELLBEING: Now resumed PO, + PO fluid intake, slept well overnight, speaking coherently in full sentences, ambulating with assistance/OOB to chair, urinating.  CONSTITUTIONAL: No weakness, fevers or chills   EYES/ENT: No visual changes; No vertigo or throat pain   NECK: No pain or stiffness   RESPIRATORY: No cough, wheezing, hemoptysis; No shortness of breath   CARDIOVASCULAR: No chest pain or palpitations   GASTROINTESTINAL: No abdominal or epigastric pain. 1 episode NBNB vomiting post procedure, no hematemesis; No diarrhea or constipation. No melena or hematochezia.  GENITOURINARY: No dysuria, frequency or hematuria  NEUROLOGICAL: No numbness or weakness  SKIN: No itching, rashes      PAST MEDICAL & SURGICAL HISTORY  CKD (chronic kidney disease)  GERD (gastroesophageal reflux disease)  History of appendectomy    FAMILY HISTORY:  FH: hypertension: father  FH: kidney disease: grandmother    ALLERGIES:  No Known Allergies    MEDICATIONS:  STANDING MEDICATIONS  calcium acetate 2001 milliGRAM(s) Oral three times a day with meals  doxercalciferol Injectable 4 MICROGram(s) IV Push <User Schedule>  epoetin karen-epbx (RETACRIT) Injectable 04866 Unit(s) IV Push <User Schedule>  heparin   Injectable 5000 Unit(s) SubCutaneous every 12 hours  pantoprazole    Tablet 40 milliGRAM(s) Oral before breakfast    PRN MEDICATIONS  prochlorperazine   Injectable 5 milliGRAM(s) IV Push four times a day PRN    VITALS:   T(F): 97.4  HR: 72  BP: 143/88  RR: 18  SpO2: 100%    LABS:                        6.9    8.46  )-----------( 197      ( 2020 20:57 )             21.5     Hemoglobin: 6.9 g/dL ( @ 20:57)  Hemoglobin: 7.3 g/dL ( @ 07:16)  Hemoglobin: 6.3 g/dL ( @ 18:34)        142  |  104  |  148<HH>  ----------------------------<  110<H>  4.9   |  11<L>  |  18.9<HH>    Ca    5.9<LL>      2020 20:57  Phos  11.1       Mg     2.3         TPro  6.5  /  Alb  4.3  /  TBili  0.5  /  DBili  x   /  AST  <5  /  ALT  <5  /  AlkPhos  54  07    Potassium Trend: 4.9<--, 4.8<--, 5.1<--  SODIUM TREND:  Sodium 142 [ @ 20:57]  Sodium 138 [ @ 07:16]  Sodium 139 [ @ 18:34]    Creatinine Trend: 18.9<--, 18.2<--, 19.7<--  PT/INR - ( 2020 18:34 )   PT: 12.90 sec;   INR: 1.12 ratio         PTT - ( 2020 18:34 )  PTT:27.7 sec  Urinalysis Basic - ( 2020 18:01 )    Color: Colorless / Appearance: Clear / S.010 / pH: x  Gluc: x / Ketone: Negative  / Bili: Negative / Urobili: <2 mg/dL   Blood: x / Protein: 100 mg/dL / Nitrite: Negative   Leuk Esterase: Negative / RBC: 1 /HPF / WBC 1 /HPF   Sq Epi: x / Non Sq Epi: 0 /HPF / Bacteria: Negative    Lipid Profile (20 @ 11:19)    Total Cholesterol/HDL Ratio Measurement: 5.2 Ratio    Cholesterol, Serum: 151 mg/dL    Triglycerides, Serum: 107 mg/dL    HDL Cholesterol, Serum: 29    A1C with Estimated Average Glucose Result: 5.7    Intact PTH: 703    Vitamin D, 25-Hydroxy: 30    CARDIAC MARKERS ( 2020 11:19 )  x     / 0.02 ng/mL / x     / x     / x      CARDIAC MARKERS ( 2020 18:34 )  x     / 0.02 ng/mL / x     / x     / x          COVID  20 @ 20:48  COVID -   [facility]      RADIOLOGY:    PHYSICAL EXAM:  GEN: No acute distress  HEENT: normocephalic, atraumatic, aniceteric, R neck Tesio now in place   LUNGS: Clear to auscultation bilaterally, no rales/wheezing/ rhonchi  HEART: S1/S2 present. RRR, no murmurs  ABD: Soft, non-tender, non-distended. Bowel sounds present  EXT: Warm, well perfused, skin color appropriate for ethnicity, +distal pulses, +motor/sensory fnxn intact x all 4 ext's.   NEURO: AAOX3, normal affect    ASSESSMENT AND PLAN:    CONSULTS:  Renal:  1. Nausea/vomiting. Likely uremic gastritis. Continue pantoprazole.  2. CKD V. Likely ESRD at this point. Tunneled catheter placement today (no AVF yet because patient contemplating hemodialysis vs peritoneal dialysis). HD today: 2 hours, opti 160 dialyzer, 3K bath, 200 mL UF. Outpatient dialysis will need to be arranged at [facility] [phone].  3. Anemia of CKD. EPO with HD.  4. Hypocalcemia. Hectorol with HD. Continue PhosLo  5. Hyperphosphatemia. PhosLo with meals. Renal diet.    ASSESSMENT:  44 Y M with pmh of CKD4 (follows with Dr. Luo, has not been compliant with follow-up), HTN (not on meds), HLD (not on meds) presents to ED with fatigue for 2 months and vomiting for last week. He says he has been so tired that he has had trouble getting out of bed for the last few months. NBNB vomiting for last week every morning. Found to have Cr of 19.7, anemic to 6.3 s/p 1 unit prbc in ED.    PLAN/ACTIVE PROBLEMS:  #HANS on CKD4  -nephrology consult Dr. Luo  -patient has very poor follow up with nephrologist  -will most likely need HD, not urgent currently though  -follow up nephrology recommendations  -check lytes, replete as needed   -check anti-DNAse b, DANIEL, urine lytes, UPEP  -troponin 0.02 on admission most likely 2/2 renal failure, trend    #Normocytic anemia- patient does not report any overt GI or  bleeding   -s/p 1 unit prbc in ED  -some microscopic hematuria on UA  -check iron studies  -keep active t/s    #Hypocalcemia + Hyperphosphatemia  -not symptomatic  -calcium 5.9  -Vit D lower level of normal, will supplement  - significantly elevated  -Initial PO4 11.1  -Phoslo with meals   -Hectorol w/HD  - Given drastically high PTH and a background of apparent ESRD high likelihood of secondary or tertiary hyperparathyroidism    #Hyperkalemia  - Potassium Trend: 4.9<--, 4.8<--, 5.1<--    #Hypermagnesemia  -Magnesium, Serum: 2.3 mg/dL (20 @ 20:57)    #Nausea/vomiting  - likely 2/2 uremic gastritis   - c/w PPI    #Increased QTc  -vomiting most likely due to uremia  - c/w compazine for N/V    #History of HTN and HLD; patient non-compliant with medications  - Lipid profile WNL  - A1C WNL        #DVT PPx- SCD  #Diet- Renal  #CHG  #Activity- AAT  #Dispo- Home  #CODE: Full  DISPOSITION:

## 2020-07-08 NOTE — BRIEF OPERATIVE NOTE - OPERATION/FINDINGS
Patient was prepped and draped in sterile fashion and a formal time out was performed. The patient was then placed in Trendelenburg position and a needle was used to enter the Right Internal jugular vein. A guidewire was then advanced into the vein using seldinger technique. Position of the guidewire was confirmed with fluoroscopy. A catheter was then placed over the guidewire following dilation of subcutaneous tissues. 2 vertical incision were made, at approximately medial 1/3 of clavicle and mid clavicular line respectively. The tract was then formed by using curved hemostats to perform blunt dissection and the catheter was placed through the tract. The tract was then closed with skin staples and 2 nylon sutures were used to secure the external portion of the catheter. Both lumens easily flushed and position was again verified with fluoroscopy. Patient tolerated the procedure well. After closing, all counts were correct and hemostasis was observed.

## 2020-07-08 NOTE — BRIEF OPERATIVE NOTE - NSICDXBRIEFPROCEDURE_GEN_ALL_CORE_FT
PROCEDURES:  Insertion, tunneled central venous cath w/o port, in patient age 5 yrs or older 08-Jul-2020 08:26:21  Anthony Villeda

## 2020-07-08 NOTE — PROGRESS NOTE ADULT - ASSESSMENT
Assessment:   44 year old male with CKD planned for tesio placement on 4/8. Patient made NPO after midnight. Preoperative labs significant for hemoglobin of 6.9 from 7.3. One unit of PRBC given. Patient laying comfortably in bed, vitals stable.    Plan:   - 1 unit PRBC s/p recent hgb drop of 6.9 from 7.3   - Patient to go to OR 7/8 AM  - NPO after midnight  - Strict Is&Os  - Incentive spirometry 10x/hour

## 2020-07-08 NOTE — PROGRESS NOTE ADULT - ATTENDING COMMENTS
Patient seen and examined independently. Agree with resident note.  # ESRD s/p HD first session today-- after tunnelled cath placement.  # severe anemia-- needs one unit of pRBC-- check AM-- presently has no SOB and repeat CBC tonight. Likely due to ESRD-- also has uremic gastritis on protonix.

## 2020-07-08 NOTE — PROGRESS NOTE ADULT - ASSESSMENT
1. Nausea/vomiting. Likely uremic gastritis. Continue pantoprazole.  2. CKD V. Likely ESRD at this point. Tunneled catheter placement today (no AVF yet because patient contemplating hemodialysis vs peritoneal dialysis). HD today: 2 hours, opti 160 dialyzer, 3K bath, 200 mL UF. Outpatient dialysis will need to be arranged at [facility] [phone].  3. Anemia of CKD. EPO with HD.  4. Hypocalcemia. Hectorol with HD. Continue PhosLo  5. Hyperphosphatemia. PhosLo with meals. Renal diet.

## 2020-07-08 NOTE — PROGRESS NOTE ADULT - SUBJECTIVE AND OBJECTIVE BOX
GENERAL SURGERY PROGRESS NOTE     ANGEL MAC  44y  Male  Hospital day :2d  OVERNIGHT EVENTS: Patient planned for tesio on . Patient made NPO after midnight. Preoperative labs significant for hemoglobin of 6.9 from 7.3. One unit of PRBC given. Patient laying comfortably in bed, vitals stable.    T(F): 96.9 (20 @ 20:24), Max: 98.8 (20 @ 08:08)  HR: 69 (20 @ 20:24) (69 - 73)  BP: 120/69 (20 @ 20:24) (120/69 - 169/85)  ABP: --  ABP(mean): --  RR: 18 (20 @ 20:24) (18 - 18)  SpO2: 99% (20 @ 08:08) (99% - 99%)    DIET/FLUIDS: calcium acetate 2001 milliGRAM(s) Oral three times a day with meals  sodium chloride 0.9%. 1000 milliLiter(s) IV Continuous <Continuous>     GI proph:  pantoprazole    Tablet 40 milliGRAM(s) Oral before breakfast    AC/ proph:   ABx:     PHYSICAL EXAM:  GENERAL: NAD, well-appearing  CHEST/LUNG: Clear to auscultation bilaterally  HEART: Regular rate and rhythm  ABDOMEN: Soft, Nontender, Nondistended;   EXTREMITIES:  No clubbing, cyanosis, or edema    Labs:  CAPILLARY BLOOD GLUCOSE                          6.9    8.46  )-----------( 197      ( 2020 20:57 )             21.5       Auto Neutrophil %: 85.8 % (20 @ 20:57)  Auto Immature Granulocyte %: 0.5 % (20 @ 20:57)        142  |  104  |  148<HH>  ----------------------------<  110<H>  4.9   |  11<L>  |  18.9<HH>      Calcium, Total Serum: 5.9 mg/dL (20 @ 20:57)      LFTs:             6.5  | 0.5  | <5       ------------------[54      ( 2020 07:16 )  4.3  | x    | <5          Lipase:x      Amylase:x         Lactate, Blood: 0.7 mmol/L (20 @ 18:34)      Coags:     12.90  ----< 1.12    ( 2020 18:34 )     27.7        CARDIAC MARKERS ( 2020 11:19 )  x     / 0.02 ng/mL / x     / x     / x      CARDIAC MARKERS ( 2020 18:34 )  x     / 0.02 ng/mL / x     / x     / x          Urinalysis Basic - ( 2020 18:01 )    Color: Colorless / Appearance: Clear / S.010 / pH: x  Gluc: x / Ketone: Negative  / Bili: Negative / Urobili: <2 mg/dL   Blood: x / Protein: 100 mg/dL / Nitrite: Negative   Leuk Esterase: Negative / RBC: 1 /HPF / WBC 1 /HPF   Sq Epi: x / Non Sq Epi: 0 /HPF / Bacteria: Negative        RADIOLOGY & ADDITIONAL TESTS:  < from: CT Head No Cont (20 @ 16:53) >  IMPRESSION:   No evidence of acute intracranial pathology.    < end of copied text >    < from: Xray Chest 2 Views PA/Lat (20 @ 18:02) >    Impression:      No radiographic evidence of acute cardiopulmonary disease.    < end of copied text >        A/P

## 2020-07-09 ENCOUNTER — TRANSCRIPTION ENCOUNTER (OUTPATIENT)
Age: 45
End: 2020-07-09

## 2020-07-09 LAB
ALBUMIN SERPL ELPH-MCNC: 3.8 G/DL — SIGNIFICANT CHANGE UP (ref 3.5–5.2)
ALP SERPL-CCNC: 50 U/L — SIGNIFICANT CHANGE UP (ref 30–115)
ALT FLD-CCNC: <5 U/L — SIGNIFICANT CHANGE UP (ref 0–41)
ANION GAP SERPL CALC-SCNC: 19 MMOL/L — HIGH (ref 7–14)
AST SERPL-CCNC: 7 U/L — SIGNIFICANT CHANGE UP (ref 0–41)
BASOPHILS # BLD AUTO: 0.01 K/UL — SIGNIFICANT CHANGE UP (ref 0–0.2)
BASOPHILS NFR BLD AUTO: 0.1 % — SIGNIFICANT CHANGE UP (ref 0–1)
BILIRUB SERPL-MCNC: 0.4 MG/DL — SIGNIFICANT CHANGE UP (ref 0.2–1.2)
BLD GP AB SCN SERPL QL: SIGNIFICANT CHANGE UP
BUN SERPL-MCNC: 87 MG/DL — CRITICAL HIGH (ref 10–20)
CALCIUM SERPL-MCNC: 6 MG/DL — LOW (ref 8.5–10.1)
CHLORIDE SERPL-SCNC: 101 MMOL/L — SIGNIFICANT CHANGE UP (ref 98–110)
CO2 SERPL-SCNC: 19 MMOL/L — SIGNIFICANT CHANGE UP (ref 17–32)
CREAT SERPL-MCNC: 13 MG/DL — CRITICAL HIGH (ref 0.7–1.5)
EOSINOPHIL # BLD AUTO: 0.22 K/UL — SIGNIFICANT CHANGE UP (ref 0–0.7)
EOSINOPHIL NFR BLD AUTO: 3.1 % — SIGNIFICANT CHANGE UP (ref 0–8)
GLUCOSE SERPL-MCNC: 116 MG/DL — HIGH (ref 70–99)
HAV IGM SER-ACNC: SIGNIFICANT CHANGE UP
HBV CORE IGM SER-ACNC: SIGNIFICANT CHANGE UP
HBV SURFACE AG SER-ACNC: SIGNIFICANT CHANGE UP
HCT VFR BLD CALC: 23.1 % — LOW (ref 42–52)
HCV AB S/CO SERPL IA: 0.07 S/CO — SIGNIFICANT CHANGE UP (ref 0–0.99)
HCV AB SERPL-IMP: SIGNIFICANT CHANGE UP
HGB BLD-MCNC: 7.6 G/DL — LOW (ref 14–18)
IMM GRANULOCYTES NFR BLD AUTO: 0.4 % — HIGH (ref 0.1–0.3)
LYMPHOCYTES # BLD AUTO: 0.55 K/UL — LOW (ref 1.2–3.4)
LYMPHOCYTES # BLD AUTO: 7.7 % — LOW (ref 20.5–51.1)
MAGNESIUM SERPL-MCNC: 1.6 MG/DL — LOW (ref 1.8–2.4)
MCHC RBC-ENTMCNC: 30.2 PG — SIGNIFICANT CHANGE UP (ref 27–31)
MCHC RBC-ENTMCNC: 32.9 G/DL — SIGNIFICANT CHANGE UP (ref 32–37)
MCV RBC AUTO: 91.7 FL — SIGNIFICANT CHANGE UP (ref 80–94)
MONOCYTES # BLD AUTO: 0.69 K/UL — HIGH (ref 0.1–0.6)
MONOCYTES NFR BLD AUTO: 9.7 % — HIGH (ref 1.7–9.3)
NEUTROPHILS # BLD AUTO: 5.61 K/UL — SIGNIFICANT CHANGE UP (ref 1.4–6.5)
NEUTROPHILS NFR BLD AUTO: 79 % — HIGH (ref 42.2–75.2)
NRBC # BLD: 0 /100 WBCS — SIGNIFICANT CHANGE UP (ref 0–0)
PHOSPHATE SERPL-MCNC: 6.9 MG/DL — HIGH (ref 2.1–4.9)
PLATELET # BLD AUTO: 166 K/UL — SIGNIFICANT CHANGE UP (ref 130–400)
POTASSIUM SERPL-MCNC: 3.9 MMOL/L — SIGNIFICANT CHANGE UP (ref 3.5–5)
POTASSIUM SERPL-SCNC: 3.9 MMOL/L — SIGNIFICANT CHANGE UP (ref 3.5–5)
PROT SERPL-MCNC: 5.8 G/DL — LOW (ref 6–8)
RBC # BLD: 2.52 M/UL — LOW (ref 4.7–6.1)
RBC # FLD: 13.5 % — SIGNIFICANT CHANGE UP (ref 11.5–14.5)
SODIUM SERPL-SCNC: 139 MMOL/L — SIGNIFICANT CHANGE UP (ref 135–146)
WBC # BLD: 7.11 K/UL — SIGNIFICANT CHANGE UP (ref 4.8–10.8)
WBC # FLD AUTO: 7.11 K/UL — SIGNIFICANT CHANGE UP (ref 4.8–10.8)

## 2020-07-09 PROCEDURE — 93306 TTE W/DOPPLER COMPLETE: CPT | Mod: 26

## 2020-07-09 PROCEDURE — 99232 SBSQ HOSP IP/OBS MODERATE 35: CPT

## 2020-07-09 RX ORDER — MAGNESIUM SULFATE 500 MG/ML
2 VIAL (ML) INJECTION ONCE
Refills: 0 | Status: COMPLETED | OUTPATIENT
Start: 2020-07-09 | End: 2020-07-09

## 2020-07-09 RX ORDER — AMLODIPINE BESYLATE 2.5 MG/1
5 TABLET ORAL DAILY
Refills: 0 | Status: DISCONTINUED | OUTPATIENT
Start: 2020-07-09 | End: 2020-07-10

## 2020-07-09 RX ADMIN — HEPARIN SODIUM 5000 UNIT(S): 5000 INJECTION INTRAVENOUS; SUBCUTANEOUS at 06:01

## 2020-07-09 RX ADMIN — Medication 25 GRAM(S): at 18:40

## 2020-07-09 RX ADMIN — HEPARIN SODIUM 5000 UNIT(S): 5000 INJECTION INTRAVENOUS; SUBCUTANEOUS at 16:33

## 2020-07-09 RX ADMIN — AMLODIPINE BESYLATE 5 MILLIGRAM(S): 2.5 TABLET ORAL at 11:38

## 2020-07-09 RX ADMIN — Medication 2001 MILLIGRAM(S): at 16:33

## 2020-07-09 RX ADMIN — Medication 2001 MILLIGRAM(S): at 11:39

## 2020-07-09 RX ADMIN — PANTOPRAZOLE SODIUM 40 MILLIGRAM(S): 20 TABLET, DELAYED RELEASE ORAL at 06:01

## 2020-07-09 RX ADMIN — Medication 2001 MILLIGRAM(S): at 08:03

## 2020-07-09 NOTE — PROGRESS NOTE ADULT - ASSESSMENT
Assessment:   44 year old male with CKD POD:0 from tesio placement in right IJ. Patient tolerated procedure well. Laying comfortably in bed with no complaints, vitals stable.     Plan:   - AM neck exam  - Postoperative CXR confirming placement  - Received HD 7/8  - Follow up AM labs   - Regular diet  - Strict Is&Os  - Incentive spirometry 10x/hour Assessment:   44 year old male with CKD POD:0 from tesio placement in right IJ. Patient tolerated procedure well. Laying comfortably in bed with no complaints, vitals stable.     Plan:   - AM neck exam  - Received HD 7/8  - Follow up AM labs   - Regular diet  - Strict Is&Os  - Incentive spirometry 10x/hour

## 2020-07-09 NOTE — PROGRESS NOTE ADULT - ASSESSMENT
1. Nausea/vomiting. Likely uremic gastritis. Continue pantoprazole.  2. CKD V. Likely ESRD at this point. Tunneled catheter placement today (no AVF yet because patient contemplating hemodialysis vs peritoneal dialysis). HD today: 2 hours, opti 160 dialyzer, 3K bath, 200 mL UF. Outpatient dialysis will need to be arranged at [facility] [phone].  3. Anemia of CKD. EPO with HD.  4. Hypocalcemia. Hectorol with HD. Continue PhosLo  5. Hyperphosphatemia. PhosLo with meals. Renal diet.  6. HTN. Started on amlodipine.

## 2020-07-09 NOTE — PROGRESS NOTE ADULT - SUBJECTIVE AND OBJECTIVE BOX
ANGEL MAC   [id number]  44y   Male PMHx of ESRD ANEMIA  ^MED EVAL  FH: hypertension  FH: kidney disease  Handoff  MEWS Score  CKD (chronic kidney disease)  GERD (gastroesophageal reflux disease)  CKD (chronic kidney disease)  CKD (chronic kidney disease)  ESRD (end stage renal disease)  Insertion, tunneled central venous cath w/o port, in patient age 5 yrs or older  History of appendectomy  MED EVAL  90+  Prolonged QT interval  Elevated troponin  Anemia    SUBJECTIVE:    Patient is a 44y old Male who presents with a chief complaint of vomiting, fatigue (2020 00:57)    Today is hospital day 3d. This morning he is resting comfortably in bed and reports no new issues or overnight events.   Currently admitted to medicine with the primary diagnosis of ESRD (end stage renal disease). Pt is post procedure day 1 after insertion of a tesio cath and is s/p 1st HD session. he looks exponentially better, has returned to AxOx3 status, his nausea/vomiting have resolved, and he is beggining to have good PO intake.        ROS:   GENERAL WELLBEING: Eating PO diet, + PO fluid intake, slept well overnight, speaking coherently in full sentences, ambulating with assistance/OOB to chair, urinating freely/looney/diaper, last BM overnight non bloody, small soft well formed per pt  CONSTITUTIONAL: No weakness, fevers or chills   EYES/ENT: No visual changes; No vertigo or throat pain   NECK: No pain or stiffness   RESPIRATORY: No cough, wheezing, hemoptysis; No shortness of breath   CARDIOVASCULAR: No chest pain or palpitations   GASTROINTESTINAL: No abdominal or epigastric pain. No nausea, vomiting, or hematemesis; No diarrhea or constipation. No melena or hematochezia.  GENITOURINARY: No dysuria, frequency or hematuria  NEUROLOGICAL: No numbness or weakness  SKIN: No itching, rashes      PAST MEDICAL & SURGICAL HISTORY  CKD (chronic kidney disease)  GERD (gastroesophageal reflux disease)  History of appendectomy    FAMILY HISTORY:  FH: hypertension: father  FH: kidney disease: grandmother      ALLERGIES:  No Known Allergies    MEDICATIONS:  STANDING MEDICATIONS  amLODIPine   Tablet 5 milliGRAM(s) Oral daily  calcium acetate 2001 milliGRAM(s) Oral three times a day with meals  doxercalciferol Injectable 4 MICROGram(s) IV Push <User Schedule>  epoetin karen-epbx (RETACRIT) Injectable 36435 Unit(s) IV Push <User Schedule>  heparin   Injectable 5000 Unit(s) SubCutaneous every 12 hours  pantoprazole    Tablet 40 milliGRAM(s) Oral before breakfast    PRN MEDICATIONS  prochlorperazine   Injectable 5 milliGRAM(s) IV Push four times a day PRN    VITALS:   T(F): 98.8  HR: 74  BP: 149/93  RR: 20  SpO2: 100%    LABS:                        7.6    7.11  )-----------( 166      ( 2020 06:32 )             23.1     Hemoglobin: 7.6 g/dL ( 06:32)  Hemoglobin: 7.1 g/dL ( 21:21)  Hemoglobin: 6.9 g/dL ( 20:57)  Hemoglobin: 7.3 g/dL ( 07:16)  Hemoglobin: 6.3 g/dL ( 18:34)        139  |  101  |  87<HH>  ----------------------------<  116<H>  3.9   |  19  |  13.0<HH>    Ca    6.0<L>      2020 06:32  Phos  6.9       Mg     1.6         TPro  5.8<L>  /  Alb  3.8  /  TBili  0.4  /  DBili  x   /  AST  7   /  ALT  <5  /  AlkPhos  50      Potassium Trend: 3.9<--, 4.9<--, 4.8<--, 5.1<--  SODIUM TREND:  Sodium 139 [ @ 06:32]  Sodium 142 [ 20:57]  Sodium 138 [ 07:16]  Sodium 139 [ 18:34]    Creatinine Trend: 13.0<--, 18.9<--, 18.2<--, 19.7<--    Urinalysis Basic - ( 2020 18:01 )    Color: Colorless / Appearance: Clear / S.010 / pH: x  Gluc: x / Ketone: Negative  / Bili: Negative / Urobili: <2 mg/dL   Blood: x / Protein: 100 mg/dL / Nitrite: Negative   Leuk Esterase: Negative / RBC: 1 /HPF / WBC 1 /HPF   Sq Epi: x / Non Sq Epi: 0 /HPF / Bacteria: Negative            CARDIAC MARKERS ( 2020 11:19 )  x     / 0.02 ng/mL / x     / x     / x          COVID  20 @ 20:48  COVID -   [facility]      RADIOLOGY:    PHYSICAL EXAM:  GEN: No acute distress  HEENT: normocephalic, atraumatic, aniceteric, tesio insertion site on R side intact and clean  LUNGS: Clear to auscultation bilaterally, no rales/wheezing/ rhonchi  HEART: S1/S2 present. RRR, no murmurs  ABD: Soft, non-tender, non-distended. Bowel sounds present  EXT: Warm, well perfused, skin color appropriate for ethnicity, +distal pulses, +motor/sensory fnxn intact x all 4 ext's.   NEURO: AAOX3, normal affect    Lines/Tubes/Catheters:  1. Tesio placed   ASSESSMENT AND PLAN:    CONSULTS:      ASSESSMENT:  44 Y M with pmh of CKD4 (follows with Dr. Luo, has not been compliant with follow-up), HTN (not on meds), HLD (not on meds) presents to ED with fatigue for 2 months and vomiting for last week. He says he has been so tired that he has had trouble getting out of bed for the last few months. NBNB vomiting for last week every morning. Found to have Cr of 19.7, anemic to 6.3 s/p 1 unit prbc in ED. Tesio placed yesterday and pt is s/p 1st HD session, he is improving significantly. Now stable for DC pending OP HD arrangements.     PLAN/ACTIVE PROBLEMS:  #HANS on CKD4  - Cr now downtrending s/p 1st HD   - Dr. Le consulting, will arrange OP HD      #Normocytic anemia  -Hemoglobin: 7.6 g/dL ( @ 06:32)  -Iron with Total Binding Capacity in AM (20 @ 07:16)    % Saturation, Iron: 71 %    Iron - Total Binding Capacity.: 213 ug/dL    Iron Total, Serum: 151 ug/dL    Unsaturated Iron Binding Capacity: 62 ug/dL  - ZANDRA unlikely, anemia related to ESRD in all likelihood, pt started on weekly EPO        #Hypocalcemia + Hyperphosphatemia  -not symptomatic  -Calcium, Total Serum: 6.0 mg/dL (20 @ 06:32) uptrending  - significantly elevated  -Initial PO4 11.1 today Phosphorus Level, Serum: 6.9 mg/dL (20 @ 06:32)  -Phoslo with meals   -C/w Hectorol w/HD  -Given drastically high PTH and a background of apparent ESRD high likelihood of secondary or tertiary hyperparathyroidism    #Hyperkalemia - resolved  - Potassium Trend: 4.9<--, 4.8<--, 5.1<--    #Hypomagnesemia (was initially hypermag)  -Presumably 2/2 1st HD yesterday  -Magnesium, Serum in AM (20 @ 06:32)    Magnesium, Serum: 1.6 mg/dL  -Likely to resolve with continued HD per attending phsyician    #Nausea/vomiting - resolved  - likely 2/2 uremic gastritis  - c/w PPI    #History of HTN and HLD; patient non-compliant with medications  - Lipid profile WNL  - A1C WNL        #DVT PPx- SCD  #Diet- Renal  #CHG  #Activity- AAT    #CODE: Full    DISPOSITION: #Dispo- Home ANGEL MAC   [id number]  44y   Male PMHx of ESRD ANEMIA  ^MED EVAL  FH: hypertension  FH: kidney disease  Handoff  MEWS Score  CKD (chronic kidney disease)  GERD (gastroesophageal reflux disease)  CKD (chronic kidney disease)  CKD (chronic kidney disease)  ESRD (end stage renal disease)  Insertion, tunneled central venous cath w/o port, in patient age 5 yrs or older  History of appendectomy  MED EVAL  90+  Prolonged QT interval  Elevated troponin  Anemia    SUBJECTIVE:    Patient is a 44y old Male who presents with a chief complaint of vomiting, fatigue (2020 00:57)    Today is hospital day 3d. This morning he is resting comfortably in bed and reports no new issues or overnight events.   Currently admitted to medicine with the primary diagnosis of ESRD (end stage renal disease). Pt is post procedure day 1 after insertion of a tesio cath and is s/p 1st HD session. he looks exponentially better, has returned to AxOx3 status, his nausea/vomiting have resolved, and he is beggining to have good PO intake.        ROS:   GENERAL WELLBEING: Eating PO diet, + PO fluid intake, slept well overnight, speaking coherently in full sentences, ambulating with assistance/OOB to chair, urinating freely/looney/diaper, last BM overnight non bloody, small soft well formed per pt  CONSTITUTIONAL: No weakness, fevers or chills   EYES/ENT: No visual changes; No vertigo or throat pain   NECK: No pain or stiffness   RESPIRATORY: No cough, wheezing, hemoptysis; No shortness of breath   CARDIOVASCULAR: No chest pain or palpitations   GASTROINTESTINAL: No abdominal or epigastric pain. No nausea, vomiting, or hematemesis; No diarrhea or constipation. No melena or hematochezia.  GENITOURINARY: No dysuria, frequency or hematuria  NEUROLOGICAL: No numbness or weakness  SKIN: No itching, rashes      PAST MEDICAL & SURGICAL HISTORY  CKD (chronic kidney disease)  GERD (gastroesophageal reflux disease)  History of appendectomy    FAMILY HISTORY:  FH: hypertension: father  FH: kidney disease: grandmother      ALLERGIES:  No Known Allergies    MEDICATIONS:  STANDING MEDICATIONS  amLODIPine   Tablet 5 milliGRAM(s) Oral daily  calcium acetate 2001 milliGRAM(s) Oral three times a day with meals  doxercalciferol Injectable 4 MICROGram(s) IV Push <User Schedule>  epoetin karen-epbx (RETACRIT) Injectable 42545 Unit(s) IV Push <User Schedule>  heparin   Injectable 5000 Unit(s) SubCutaneous every 12 hours  pantoprazole    Tablet 40 milliGRAM(s) Oral before breakfast    PRN MEDICATIONS  prochlorperazine   Injectable 5 milliGRAM(s) IV Push four times a day PRN    VITALS:   T(F): 98.8  HR: 74  BP: 149/93  RR: 20  SpO2: 100%    LABS:                        7.6    7.11  )-----------( 166      ( 2020 06:32 )             23.1     Hemoglobin: 7.6 g/dL ( 06:32)  Hemoglobin: 7.1 g/dL ( 21:21)  Hemoglobin: 6.9 g/dL ( 20:57)  Hemoglobin: 7.3 g/dL ( 07:16)  Hemoglobin: 6.3 g/dL ( 18:34)        139  |  101  |  87<HH>  ----------------------------<  116<H>  3.9   |  19  |  13.0<HH>    Ca    6.0<L>      2020 06:32  Phos  6.9       Mg     1.6         TPro  5.8<L>  /  Alb  3.8  /  TBili  0.4  /  DBili  x   /  AST  7   /  ALT  <5  /  AlkPhos  50      Potassium Trend: 3.9<--, 4.9<--, 4.8<--, 5.1<--  SODIUM TREND:  Sodium 139 [ @ 06:32]  Sodium 142 [ 20:57]  Sodium 138 [ 07:16]  Sodium 139 [ 18:34]    Creatinine Trend: 13.0<--, 18.9<--, 18.2<--, 19.7<--    Urinalysis Basic - ( 2020 18:01 )    Color: Colorless / Appearance: Clear / S.010 / pH: x  Gluc: x / Ketone: Negative  / Bili: Negative / Urobili: <2 mg/dL   Blood: x / Protein: 100 mg/dL / Nitrite: Negative   Leuk Esterase: Negative / RBC: 1 /HPF / WBC 1 /HPF   Sq Epi: x / Non Sq Epi: 0 /HPF / Bacteria: Negative            CARDIAC MARKERS ( 2020 11:19 )  x     / 0.02 ng/mL / x     / x     / x          COVID  20 @ 20:48  COVID -   [facility]      RADIOLOGY:    PHYSICAL EXAM:  GEN: No acute distress  HEENT: normocephalic, atraumatic, aniceteric, tesio insertion site on R side intact and clean  LUNGS: Clear to auscultation bilaterally, no rales/wheezing/ rhonchi  HEART: S1/S2 present. RRR, no murmurs  ABD: Soft, non-tender, non-distended. Bowel sounds present  EXT: Warm, well perfused, skin color appropriate for ethnicity, +distal pulses, +motor/sensory fnxn intact x all 4 ext's.   NEURO: AAOX3, normal affect    Lines/Tubes/Catheters:  1. Tesio placed   ASSESSMENT AND PLAN:    CONSULTS:      ASSESSMENT:  44 Y M with pmh of CKD4 (follows with Dr. Luo, has not been compliant with follow-up), HTN (not on meds), HLD (not on meds) presents to ED with fatigue for 2 months and vomiting for last week. He says he has been so tired that he has had trouble getting out of bed for the last few months. NBNB vomiting for last week every morning. Found to have Cr of 19.7, anemic to 6.3 s/p 1 unit prbc in ED. Tesio placed yesterday and pt is s/p 1st HD session, he is improving significantly. Now stable for DC pending dialysis in house today and tomorrow and OP dialysis arrangements.     PLAN/ACTIVE PROBLEMS:  #HANS on CKD4  - Cr now downtrending s/p 1st HD   - Dr. Le consulting, will arrange OP HD      #Normocytic anemia  -Hemoglobin: 7.6 g/dL ( @ 06:32)  -Iron with Total Binding Capacity in AM (20 @ 07:16)    % Saturation, Iron: 71 %    Iron - Total Binding Capacity.: 213 ug/dL    Iron Total, Serum: 151 ug/dL    Unsaturated Iron Binding Capacity: 62 ug/dL  - ZANDRA unlikely, anemia related to ESRD in all likelihood, pt started on weekly EPO        #Hypocalcemia + Hyperphosphatemia  -not symptomatic  -Calcium, Total Serum: 6.0 mg/dL (20 @ 06:32) uptrending  - significantly elevated  -Initial PO4 11.1 today Phosphorus Level, Serum: 6.9 mg/dL (20 @ 06:32)  -Phoslo with meals   -C/w Hectorol w/HD  -Given drastically high PTH and a background of apparent ESRD high likelihood of secondary or tertiary hyperparathyroidism    #Hyperkalemia - resolved  - Potassium Trend: 4.9<--, 4.8<--, 5.1<--    #Hypomagnesemia (was initially hypermag)  -Presumably 2/2 1st HD yesterday  -Magnesium, Serum in AM (20 @ 06:32)    Magnesium, Serum: 1.6 mg/dL  -Likely to resolve with continued HD however after discussion with nephrology will replete this evening after HD 2g mag sulfate over 2 hours     #Nausea/vomiting - resolved  - likely 2/2 uremic gastritis  - c/w PPI    #History of HTN and HLD; patient non-compliant with medications  - Lipid profile WNL  - A1C WNL        #DVT PPx- SCD  #Diet- Renal  #CHG  #Activity- AAT    #CODE: Full    DISPOSITION: #Dispo- Home w/dialysis after tomorrow HD session

## 2020-07-09 NOTE — PROGRESS NOTE ADULT - SUBJECTIVE AND OBJECTIVE BOX
GENERAL SURGERY PROGRESS NOTE     ANGEL MAC  44y  Male  Hospital day :2d  POD: 0  OVERNIGHT EVENTS: Patient s/p tesio placement.  No acute events overnight. Patient hemodynamically stable with no new complaints.  T(F): 96.9 (20 @ 20:24), Max: 98.8 (20 @ 08:08)  HR: 69 (20 @ 20:24) (69 - 73)  BP: 120/69 (20 @ 20:24) (120/69 - 169/85)  ABP: --  ABP(mean): --  RR: 18 (20 @ 20:24) (18 - 18)  SpO2: 99% (20 @ 08:08) (99% - 99%)    DIET/FLUIDS: calcium acetate 2001 milliGRAM(s) Oral three times a day with meals  sodium chloride 0.9%. 1000 milliLiter(s) IV Continuous <Continuous>     GI proph:  pantoprazole    Tablet 40 milliGRAM(s) Oral before breakfast    PHYSICAL EXAM:  GENERAL: NAD, well-appearing  CHEST/LUNG: Clear to auscultation bilaterally, right IJ tesio in place   HEART: Regular rate and rhythm  ABDOMEN: Soft, Nontender, Nondistended;   EXTREMITIES:  No clubbing, cyanosis, or edema    Labs:  CAPILLARY BLOOD GLUCOSE                          6.9    8.46  )-----------( 197      ( 2020 20:57 )             21.5       Auto Neutrophil %: 85.8 % (20 @ 20:57)  Auto Immature Granulocyte %: 0.5 % (20 @ 20:57)        142  |  104  |  148<HH>  ----------------------------<  110<H>  4.9   |  11<L>  |  18.9<HH>      Calcium, Total Serum: 5.9 mg/dL (20 @ 20:57)      LFTs:             6.5  | 0.5  | <5       ------------------[54      ( 2020 07:16 )  4.3  | x    | <5          Lipase:x      Amylase:x         Lactate, Blood: 0.7 mmol/L (20 @ 18:34)      Coags:     12.90  ----< 1.12    ( 2020 18:34 )     27.7        CARDIAC MARKERS ( 2020 11:19 )  x     / 0.02 ng/mL / x     / x     / x      CARDIAC MARKERS ( 2020 18:34 )  x     / 0.02 ng/mL / x     / x     / x          Urinalysis Basic - ( 2020 18:01 )    Color: Colorless / Appearance: Clear / S.010 / pH: x  Gluc: x / Ketone: Negative  / Bili: Negative / Urobili: <2 mg/dL   Blood: x / Protein: 100 mg/dL / Nitrite: Negative   Leuk Esterase: Negative / RBC: 1 /HPF / WBC 1 /HPF   Sq Epi: x / Non Sq Epi: 0 /HPF / Bacteria: Negative        RADIOLOGY & ADDITIONAL TESTS:  < from: CT Head No Cont (20 @ 16:53) >  IMPRESSION:   No evidence of acute intracranial pathology.    < end of copied text >    < from: Xray Chest 1 View- PORTABLE-Urgent (20 @ 09:45) >  Impression:    No radiographic evidence of acute cardiopulmonary disease.  New right-sided dialysis catheter as above.  < end of copied text >

## 2020-07-09 NOTE — PROGRESS NOTE ADULT - ATTENDING COMMENTS
Patient seen and examined independently. Agree with resident note.  # Ckd 4 now ESRD on HD--- HD packet material to be faxed to [facility] [place].  #Anemia-- did not require transfusions-- now on epogen-- likely cause is ESRD  # uremic gastritis resolved-- patient has an appetite now.  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  Dc planning soon. Patient seen and examined independently. Agree with resident note.  # Ckd 4 now ESRD on HD--- HD packet material to be faxed to [facility] [place].  #Anemia-- did not require transfusions-- now on epogen-- likely cause is ESRD  # uremic gastritis resolved-- patient has an appetite now.  # Hyperphosphatemia resolving  # Hypocalcemia due to tertiary hyper parathyroidism is resolving  # Htn added amlodipine 5mg.  Dc planning soon.

## 2020-07-09 NOTE — PROGRESS NOTE ADULT - SUBJECTIVE AND OBJECTIVE BOX
[place] FOLLOW UP NOTE  --------------------------------------------------------------------------------  24 hour events/subjective: Patient examined examined during HD. Appears comfortable.    PAST HISTORY  --------------------------------------------------------------------------------  No significant changes to PMH, PSH, FHx, SHx, unless otherwise noted    ALLERGIES & MEDICATIONS  --------------------------------------------------------------------------------  Allergies    No Known Allergies    Standing Inpatient Medications  amLODIPine   Tablet 5 milliGRAM(s) Oral daily  calcium acetate 2001 milliGRAM(s) Oral three times a day with meals  doxercalciferol Injectable 4 MICROGram(s) IV Push <User Schedule>  epoetin karen-epbx (RETACRIT) Injectable 60573 Unit(s) IV Push <User Schedule>  heparin   Injectable 5000 Unit(s) SubCutaneous every 12 hours  pantoprazole    Tablet 40 milliGRAM(s) Oral before breakfast    PRN Inpatient Medications  prochlorperazine   Injectable 5 milliGRAM(s) IV Push four times a day PRN    VITALS/PHYSICAL EXAM  --------------------------------------------------------------------------------  T(C): 37.1 (07-09-20 @ 05:15), Max: 37.1 (07-09-20 @ 05:15)  HR: 74 (07-09-20 @ 12:13) (72 - 89)  BP: 168/94 (07-09-20 @ 12:13) (119/73 - 174/94)  RR: 20 (07-09-20 @ 12:13) (18 - 20)  SpO2: 100% (07-09-20 @ 07:55) (100% - 100%)  Height (cm): 170.18 (07-08-20 @ 07:19)  Weight (kg): 59 (07-08-20 @ 07:19)  BMI (kg/m2): 20.4 (07-08-20 @ 07:19)  BSA (m2): 1.68 (07-08-20 @ 07:19)    07-08-20 @ 07:01  -  07-09-20 @ 07:00  --------------------------------------------------------  IN: 330 mL / OUT: 280 mL / NET: 50 mL      Physical Exam:  	Gen: NAD  	Pulm: CTA B/L  	CV: RRR, S1S2  	Abd: +BS, soft, nontender/nondistended  	: No suprapubic tenderness  	LE: Warm, no edema  	Vascular access: TDC    LABS/STUDIES  --------------------------------------------------------------------------------              7.6    7.11  >-----------<  166      [07-09-20 @ 06:32]              23.1     139  |  101  |  87  ----------------------------<  116      [07-09-20 @ 06:32]  3.9   |  19  |  13.0        Ca     6.0     [07-09-20 @ 06:32]      Mg     1.6     [07-09-20 @ 06:32]      Phos  6.9     [07-09-20 @ 06:32]    TPro  5.8  /  Alb  3.8  /  TBili  0.4  /  DBili  x   /  AST  7   /  ALT  <5  /  AlkPhos  50  [07-09-20 @ 06:32]    Creatinine Trend:  SCr 13.0 [07-09 @ 06:32]  SCr 18.9 [07-07 @ 20:57]  SCr 18.2 [07-07 @ 07:16]  SCr 19.7 [07-06 @ 18:34]    Urinalysis - [07-07-20 @ 18:01]      Color Colorless / Appearance Clear / SG 1.010 / pH 6.0      Gluc Negative / Ketone Negative  / Bili Negative / Urobili <2 mg/dL       Blood Small / Protein 100 mg/dL / Leuk Est Negative / Nitrite Negative      RBC 1 / WBC 1 / Hyaline 0 / Gran  / Sq Epi  / Non Sq Epi 0 / Bacteria Negative    Urine Creatinine 57      [07-07-20 @ 18:01]  Urine Protein 164      [07-07-20 @ 18:01]  Urine Sodium 77.0      [07-07-20 @ 18:01]  Urine Osmolality 303      [07-07-20 @ 18:01]    Iron 151, TIBC 213, %sat 71      [07-07-20 @ 07:16]  Ferritin 199      [07-07-20 @ 07:16]  PTH -- (Ca 5.7)      [07-07-20 @ 11:19]   703  Vitamin D (25OH) 30      [07-07-20 @ 11:19]  Lipid: chol 151, , HDL 29,       [07-07-20 @ 11:19]    HBsAb Nonreact      [07-08-20 @ 06:20]  HBsAg Nonreact      [07-08-20 @ 06:20]  HCV 0.07, Nonreact      [07-08-20 @ 06:20]    DANIEL: titer Negative, pattern --      [07-07-20 @ 07:16]

## 2020-07-10 ENCOUNTER — TRANSCRIPTION ENCOUNTER (OUTPATIENT)
Age: 45
End: 2020-07-10

## 2020-07-10 VITALS
SYSTOLIC BLOOD PRESSURE: 152 MMHG | TEMPERATURE: 99 F | HEART RATE: 85 BPM | DIASTOLIC BLOOD PRESSURE: 84 MMHG | RESPIRATION RATE: 20 BRPM

## 2020-07-10 LAB
ALBUMIN SERPL ELPH-MCNC: 3.8 G/DL — SIGNIFICANT CHANGE UP (ref 3.5–5.2)
ALP SERPL-CCNC: 50 U/L — SIGNIFICANT CHANGE UP (ref 30–115)
ALT FLD-CCNC: <5 U/L — SIGNIFICANT CHANGE UP (ref 0–41)
ANION GAP SERPL CALC-SCNC: 16 MMOL/L — HIGH (ref 7–14)
AST SERPL-CCNC: 7 U/L — SIGNIFICANT CHANGE UP (ref 0–41)
BASOPHILS # BLD AUTO: 0.02 K/UL — SIGNIFICANT CHANGE UP (ref 0–0.2)
BASOPHILS NFR BLD AUTO: 0.3 % — SIGNIFICANT CHANGE UP (ref 0–1)
BILIRUB SERPL-MCNC: <0.2 MG/DL — SIGNIFICANT CHANGE UP (ref 0.2–1.2)
BUN SERPL-MCNC: 58 MG/DL — HIGH (ref 10–20)
CALCIUM SERPL-MCNC: 7 MG/DL — LOW (ref 8.5–10.1)
CHLORIDE SERPL-SCNC: 101 MMOL/L — SIGNIFICANT CHANGE UP (ref 98–110)
CO2 SERPL-SCNC: 24 MMOL/L — SIGNIFICANT CHANGE UP (ref 17–32)
CREAT SERPL-MCNC: 9.4 MG/DL — CRITICAL HIGH (ref 0.7–1.5)
EOSINOPHIL # BLD AUTO: 0.28 K/UL — SIGNIFICANT CHANGE UP (ref 0–0.7)
EOSINOPHIL NFR BLD AUTO: 3.9 % — SIGNIFICANT CHANGE UP (ref 0–8)
GLUCOSE BLDC GLUCOMTR-MCNC: 136 MG/DL — HIGH (ref 70–99)
GLUCOSE SERPL-MCNC: 121 MG/DL — HIGH (ref 70–99)
HCT VFR BLD CALC: 23.5 % — LOW (ref 42–52)
HGB BLD-MCNC: 7.7 G/DL — LOW (ref 14–18)
IMM GRANULOCYTES NFR BLD AUTO: 0.1 % — SIGNIFICANT CHANGE UP (ref 0.1–0.3)
LYMPHOCYTES # BLD AUTO: 0.64 K/UL — LOW (ref 1.2–3.4)
LYMPHOCYTES # BLD AUTO: 9 % — LOW (ref 20.5–51.1)
MAGNESIUM SERPL-MCNC: 2.3 MG/DL — SIGNIFICANT CHANGE UP (ref 1.8–2.4)
MCHC RBC-ENTMCNC: 30.8 PG — SIGNIFICANT CHANGE UP (ref 27–31)
MCHC RBC-ENTMCNC: 32.8 G/DL — SIGNIFICANT CHANGE UP (ref 32–37)
MCV RBC AUTO: 94 FL — SIGNIFICANT CHANGE UP (ref 80–94)
MONOCYTES # BLD AUTO: 0.8 K/UL — HIGH (ref 0.1–0.6)
MONOCYTES NFR BLD AUTO: 11.2 % — HIGH (ref 1.7–9.3)
MRSA PCR RESULT.: POSITIVE
NEUTROPHILS # BLD AUTO: 5.37 K/UL — SIGNIFICANT CHANGE UP (ref 1.4–6.5)
NEUTROPHILS NFR BLD AUTO: 75.5 % — HIGH (ref 42.2–75.2)
NRBC # BLD: 0 /100 WBCS — SIGNIFICANT CHANGE UP (ref 0–0)
PHOSPHATE SERPL-MCNC: 4.7 MG/DL — SIGNIFICANT CHANGE UP (ref 2.1–4.9)
PLATELET # BLD AUTO: 160 K/UL — SIGNIFICANT CHANGE UP (ref 130–400)
POTASSIUM SERPL-MCNC: 3.7 MMOL/L — SIGNIFICANT CHANGE UP (ref 3.5–5)
POTASSIUM SERPL-SCNC: 3.7 MMOL/L — SIGNIFICANT CHANGE UP (ref 3.5–5)
PROT SERPL-MCNC: 5.9 G/DL — LOW (ref 6–8)
RBC # BLD: 2.5 M/UL — LOW (ref 4.7–6.1)
RBC # FLD: 13.4 % — SIGNIFICANT CHANGE UP (ref 11.5–14.5)
SODIUM SERPL-SCNC: 141 MMOL/L — SIGNIFICANT CHANGE UP (ref 135–146)
STREP DNASE B TITR SER: < 95 U/ML — SIGNIFICANT CHANGE UP
WBC # BLD: 7.12 K/UL — SIGNIFICANT CHANGE UP (ref 4.8–10.8)
WBC # FLD AUTO: 7.12 K/UL — SIGNIFICANT CHANGE UP (ref 4.8–10.8)

## 2020-07-10 PROCEDURE — 99239 HOSP IP/OBS DSCHRG MGMT >30: CPT

## 2020-07-10 RX ORDER — LISINOPRIL 2.5 MG/1
1 TABLET ORAL
Qty: 30 | Refills: 0
Start: 2020-07-10 | End: 2020-08-08

## 2020-07-10 RX ORDER — MUPIROCIN 20 MG/G
1 OINTMENT TOPICAL
Refills: 0 | Status: DISCONTINUED | OUTPATIENT
Start: 2020-07-10 | End: 2020-07-10

## 2020-07-10 RX ADMIN — Medication 2001 MILLIGRAM(S): at 08:06

## 2020-07-10 RX ADMIN — AMLODIPINE BESYLATE 5 MILLIGRAM(S): 2.5 TABLET ORAL at 05:21

## 2020-07-10 RX ADMIN — PANTOPRAZOLE SODIUM 40 MILLIGRAM(S): 20 TABLET, DELAYED RELEASE ORAL at 05:20

## 2020-07-10 RX ADMIN — Medication 2001 MILLIGRAM(S): at 13:39

## 2020-07-10 NOTE — DISCHARGE NOTE PROVIDER - CARE PROVIDER_API CALL
Teddy Luo  INTERNAL MEDICINE  [address] [place] [address]  Phone: [phone]  Fax: [phone]  Follow Up Time: 1 week

## 2020-07-10 NOTE — PROGRESS NOTE ADULT - REASON FOR ADMISSION
vomiting, fatigue

## 2020-07-10 NOTE — DISCHARGE NOTE PROVIDER - HOSPITAL COURSE
45 YO M with a PMHx of untreated/not managed CKD4, HTN, DLD presented to the ED with 2 months of fatigue and 1 week of nausea/vomiting associated with severe headache and vision changes also complained of some weight loss and poor appetite. Pt was found to have a severely elevated creatinine and to be in renal failure, Dx of ESRD was made, pt nausea/vomiting and HA/vision changes were likely the result of uremic gastritis and uremic/toxic + HTNsive encephalopathy respectively. Pt was found to have severely low calcium and severely elevated phosphorous and PTH presumably secondary hyperparathyroidsm related to his kidney failure. Pt was admitted to medicine had a R side Tessio catheter placed and underwent 3 days of consecutive HD sessions the last being today, he was treated with phosphate binders and supplemental vitamin D and as of today has improved exponentially from his arrival. He is currently medically stable for discharge and will begin HD on a weekly schedule as determined by nephrology and arranged by care management.

## 2020-07-10 NOTE — PROGRESS NOTE ADULT - SUBJECTIVE AND OBJECTIVE BOX
SUBJECTIVE:    Patient is a 44y old Male who presents with a chief complaint of vomiting, fatigue (10 Jul 2020 13:50)    Currently admitted to medicine with the primary diagnosis of ESRD (end stage renal disease)     Today is hospital day 4d.     PAST MEDICAL & SURGICAL HISTORY  CKD (chronic kidney disease)  GERD (gastroesophageal reflux disease)  History of appendectomy    ALLERGIES:  No Known Allergies    MEDICATIONS:  STANDING MEDICATIONS  amLODIPine   Tablet 5 milliGRAM(s) Oral daily  calcium acetate 2001 milliGRAM(s) Oral three times a day with meals  doxercalciferol Injectable 4 MICROGram(s) IV Push <User Schedule>  epoetin karen-epbx (RETACRIT) Injectable 80460 Unit(s) IV Push <User Schedule>  heparin   Injectable 5000 Unit(s) SubCutaneous every 12 hours  mupirocin 2% Ointment 1 Application(s) Both Nostrils two times a day  pantoprazole    Tablet 40 milliGRAM(s) Oral before breakfast    PRN MEDICATIONS  prochlorperazine   Injectable 5 milliGRAM(s) IV Push four times a day PRN    VITALS:   T(F): 99.3  HR: 85  BP: 152/84  RR: 20  SpO2: 98%    LABS:                        7.7    7.12  )-----------( 160      ( 10 Jul 2020 05:20 )             23.5     07-10    141  |  101  |  58<H>  ----------------------------<  121<H>  3.7   |  24  |  9.4<HH>    Ca    7.0<L>      10 Jul 2020 05:20  Phos  4.7     07-10  Mg     2.3     07-10    TPro  5.9<L>  /  Alb  3.8  /  TBili  <0.2  /  DBili  x   /  AST  7   /  ALT  <5  /  AlkPhos  50  07-10                  RADIOLOGY:    PHYSICAL EXAM:  GEN: No acute distress  LUNGS: Clear to auscultation bilaterally   HEART: S1/S2 present. RRR.   ABD/ GI: Soft, non-tender, non-distended. Bowel sounds present  EXT: NC/NC/NE/2+PP/FLORES  NEURO: AAOX3

## 2020-07-10 NOTE — PROGRESS NOTE ADULT - SUBJECTIVE AND OBJECTIVE BOX
[facility] FOLLOW UP NOTE  --------------------------------------------------------------------------------  24 hour events/subjective: Patient examined during HD. Appears comfortable.    PAST HISTORY  --------------------------------------------------------------------------------  No significant changes to PMH, PSH, FHx, SHx, unless otherwise noted    ALLERGIES & MEDICATIONS  --------------------------------------------------------------------------------  Allergies    No Known Allergies    Intolerances      Standing Inpatient Medications  amLODIPine   Tablet 5 milliGRAM(s) Oral daily  calcium acetate 2001 milliGRAM(s) Oral three times a day with meals  doxercalciferol Injectable 4 MICROGram(s) IV Push <User Schedule>  epoetin karen-epbx (RETACRIT) Injectable 82491 Unit(s) IV Push <User Schedule>  heparin   Injectable 5000 Unit(s) SubCutaneous every 12 hours  mupirocin 2% Ointment 1 Application(s) Both Nostrils two times a day  pantoprazole    Tablet 40 milliGRAM(s) Oral before breakfast    PRN Inpatient Medications  prochlorperazine   Injectable 5 milliGRAM(s) IV Push four times a day PRN    VITALS/PHYSICAL EXAM  --------------------------------------------------------------------------------  T(C): 37.4 (07-10-20 @ 13:15), Max: 37.4 (07-10-20 @ 13:15)  HR: 85 (07-10-20 @ 13:15) (79 - 102)  BP: 152/84 (07-10-20 @ 13:15) (132/86 - 173/94)  RR: 20 (07-10-20 @ 13:15) (18 - 20)  SpO2: 98% (07-10-20 @ 08:25) (98% - 98%)    07-09-20 @ 07:01  -  07-10-20 @ 07:00  --------------------------------------------------------  IN: 25 mL / OUT: 202 mL / NET: -177 mL    Physical Exam:  	Gen: NAD  	Pulm: CTA B/L  	CV: RRR, S1S2  	Abd: +BS, soft, nontender/nondistended  	: No suprapubic tenderness  	LE: Warm,  no edema  	Vascular access: TDC    LABS/STUDIES  --------------------------------------------------------------------------------              7.7    7.12  >-----------<  160      [07-10-20 @ 05:20]              23.5     141  |  101  |  58  ----------------------------<  121      [07-10-20 @ 05:20]  3.7   |  24  |  9.4        Ca     7.0     [07-10-20 @ 05:20]      Mg     2.3     [07-10-20 @ 05:20]      Phos  4.7     [07-10-20 @ 05:20]    TPro  5.9  /  Alb  3.8  /  TBili  <0.2  /  DBili  x   /  AST  7   /  ALT  <5  /  AlkPhos  50  [07-10-20 @ 05:20]    Creatinine Trend:  SCr 9.4 [07-10 @ 05:20]  SCr 13.0 [07-09 @ 06:32]  SCr 18.9 [07-07 @ 20:57]  SCr 18.2 [07-07 @ 07:16]  SCr 19.7 [07-06 @ 18:34]    Urinalysis - [07-07-20 @ 18:01]      Color Colorless / Appearance Clear / SG 1.010 / pH 6.0      Gluc Negative / Ketone Negative  / Bili Negative / Urobili <2 mg/dL       Blood Small / Protein 100 mg/dL / Leuk Est Negative / Nitrite Negative      RBC 1 / WBC 1 / Hyaline 0 / Gran  / Sq Epi  / Non Sq Epi 0 / Bacteria Negative    Urine Creatinine 57      [07-07-20 @ 18:01]  Urine Protein 164      [07-07-20 @ 18:01]  Urine Sodium 77.0      [07-07-20 @ 18:01]  Urine Osmolality 303      [07-07-20 @ 18:01]    Iron 151, TIBC 213, %sat 71      [07-07-20 @ 07:16]  Ferritin 199      [07-07-20 @ 07:16]  PTH -- (Ca 5.7)      [07-07-20 @ 11:19]   703  Vitamin D (25OH) 30      [07-07-20 @ 11:19]  Lipid: chol 151, , HDL 29,       [07-07-20 @ 11:19]    HBsAb Nonreact      [07-08-20 @ 06:20]  HBsAg Nonreact      [07-09-20 @ 06:32]  HCV 0.07, Nonreact      [07-09-20 @ 06:32]    DANIEL: titer Negative, pattern --      [07-07-20 @ 07:16]

## 2020-07-10 NOTE — DISCHARGE NOTE NURSING/CASE MANAGEMENT/SOCIAL WORK - PATIENT PORTAL LINK FT
You can access the FollowMyHealth Patient Portal offered by [facility] by registering at the following website: [email or web address]. By joining [employer]’s FollowMyHealth portal, you will also be able to view your health information using other applications (apps) compatible with our system.
You can access the FollowMyHealth Patient Portal offered by [facility] by registering at the following website: [email or web address]. By joining [employer]’s FollowMyHealth portal, you will also be able to view your health information using other applications (apps) compatible with our system.

## 2020-07-10 NOTE — DISCHARGE NOTE NURSING/CASE MANAGEMENT/SOCIAL WORK - NSDPACMPNY_GEN_ALL_CORE
Spouse
Traveling alone
Regular rate and rhythm, Heart sounds S1 S2 present, no murmurs, rubs or gallops

## 2020-07-10 NOTE — PROGRESS NOTE ADULT - ASSESSMENT
1. Nausea/vomiting. Likely uremic gastritis. Continue pantoprazole.  2. CKD V. Likely ESRD at this point. Tunneled catheter placement today (no AVF yet because patient contemplating hemodialysis vs peritoneal dialysis). HD today: 2.5 hours, opti 160 dialyzer, 3K bath, 500 mL UF. Outpatient dialysis at [facility] [phone] on Tuesday at 11am.  3. Anemia of CKD. EPO with HD.  4. Hypocalcemia. Hectorol with HD.   5. Hyperphosphatemia. Resolved. DC PhosLo. Renal diet.  6. HTN. DC amlodipine. Start lisinopril 20mg PO daily.

## 2020-07-10 NOTE — DISCHARGE NOTE PROVIDER - NSFOLLOWUPCLINICS_GEN_ALL_ED_FT
[facility]  Medicine  [address], [place]   Phone: [phone]  Fax:   Follow Up Time: 1 week

## 2020-07-10 NOTE — DISCHARGE NOTE PROVIDER - NSDCFUADDINST_GEN_ALL_CORE_FT
Please be as diligent as possible in taking your medications and call with any related questions or issues.
never intubated

## 2020-07-10 NOTE — DISCHARGE NOTE PROVIDER - NSDCCPCAREPLAN_GEN_ALL_CORE_FT
PRINCIPAL DISCHARGE DIAGNOSIS  Diagnosis: ESRD (end stage renal disease)  Assessment and Plan of Treatment: Chronic kidney disease can (CKD) happen because of many things. These include infections, diabetes, high blood pressure, kidney stones, circulation problems, and reactions to medicine. Having kidney disease means making many changes in your life. Learn as much as you can about it so that you can better adjust to these changes. It's important to remember that the main goal of treatment is to stop CKD from progressing to complete kidney failure. Treatments may vary based on the progression of CKD. Always follow your healthcare provider's instructions on how to manage your condition.  Here are some things you can do to help your condition.  Diet changes  Always discuss your diet with your healthcare provider before making any changes. Some people find the required dietary changes overwhelming and confusing. If it feels like that to you or your family members, ask your provider to meet with a registered dietitian to get help managing the changes to your diet.  Salt (sodium) in your diet  Based on your condition, you may be told to eat 1,500 mg or less of sodium daily  Limit processed foods such as:  Frozen dinners and packaged meals  Canned fish and meats  Pickled foods  Salted snacks  Lunch meats  Sauces  Most cheeses  Fast foods  Don't add salt to your food while cooking or before eating at the table.  Eat unprocessed foods to lower the sodium, such as:  Fresh turkey and chicken  Lean beef  Unsalted tuna  Fresh fish  Fresh vegetables and fruits  Season foods with fresh herbs, garlic, onions, citrus, flavored vinegar, and sodium-free spice blends instead of salt when cooking.  Don't use salt substitutes that are high in potassium. Ask your healthcare provider or a registered dietitian which salt substitutes to use.  Don't drink softened water, because of the sodium content. Make sure to read the label on bottled water for sodium content.  Don't take over-the-counter (OTC) medicines that contain sodium bicarbonate or sodium carbonate. Read labels carefully. If you aren't      SECONDARY DISCHARGE DIAGNOSES  Diagnosis: Anemia  Assessment and Plan of Treatment: You were found to have anemia as a result of your severely decreased kidney function.  Anemia  Anemia is a condition in which the concentration of red blood cells or hemoglobin in the blood is below normal. Hemoglobin is a substance in red blood cells that carries oxygen to the tissues of the body. Anemia results in not enough oxygen reaching these tissues which can cause symptoms such as weakness, dizziness/lightheadedness, shortness of breath, chest pain, paleness, or nausea. The cause of your anemia may or may not be determined immediately. If your hemoglobin was dangerously low, you may have received a blood transfusion. Usually reactions to transfusions occur immediately but monitor yourself for any fevers, rash, or shortness of breath.  SEEK IMMEDIATE MEDICAL CARE IF YOU HAVE ANY OF THE FOLLOWING SYMPTOMS: extreme weakness/chest pain/shortness of breath, black or bloody stools, vomiting blood, fainting, fever, or any signs of dehydration.

## 2020-07-10 NOTE — PROGRESS NOTE ADULT - ASSESSMENT
44 Y M with pmh of CKD4 (follows with Dr. Luo, has not been compliant with follow-up), HTN (not on meds), HLD (not on meds) presents to ED with fatigue for 2 months and vomiting for last week. He says he has been so tired that he has had trouble getting out of bed for the last few months. NBNB vomiting for last week every morning. Found to have Cr of 19.7, anemic to 6.3 s/p 1 unit prbc in ED. Tesio placed yesterday and pt is s/p 1st HD session, he is improving significantly. Now stable for DC pending dialysis in house today and tomorrow and OP dialysis arrangements.     # HANS on CKD-- started on HD-- tolerating it well.  # Anemia-- of chr disease-- started on epogen-- iron stores are adequate.  # Htn-- started on lisinopril 20mg as per nephrology  # Secondary hyperparathyroidsm--hectoral with HD-- hyperphosphatemia is resolved.  Dc home today--spent more than 30mins.

## 2020-07-11 LAB
% GAMMA, URINE: 10.9 % — SIGNIFICANT CHANGE UP
ALBUMIN 24H MFR UR ELPH: 64.5 % — SIGNIFICANT CHANGE UP
ALPHA1 GLOB 24H MFR UR ELPH: 9.2 % — SIGNIFICANT CHANGE UP
ALPHA2 GLOB 24H MFR UR ELPH: 7.2 % — SIGNIFICANT CHANGE UP
B-GLOBULIN 24H MFR UR ELPH: 8.2 % — SIGNIFICANT CHANGE UP
INTERPRETATION 24H UR IFE-IMP: SIGNIFICANT CHANGE UP
INTERPRETATION 24H UR IFE-IMP: SIGNIFICANT CHANGE UP
M PROTEIN 24H UR ELPH-MRATE: SIGNIFICANT CHANGE UP
PROT ?TM UR-MCNC: 164 MG/DL — HIGH (ref 0–12)
PROT PATTERN 24H UR ELPH-IMP: SIGNIFICANT CHANGE UP
TOTAL VOLUME - 24 HOUR: SIGNIFICANT CHANGE UP ML
URINE CREATININE CALCULATION: SIGNIFICANT CHANGE UP G/24 H (ref 1–2)

## 2020-07-13 DIAGNOSIS — E83.42 HYPOMAGNESEMIA: ICD-10-CM

## 2020-07-13 DIAGNOSIS — E87.5 HYPERKALEMIA: ICD-10-CM

## 2020-07-13 DIAGNOSIS — I67.4 HYPERTENSIVE ENCEPHALOPATHY: ICD-10-CM

## 2020-07-13 DIAGNOSIS — N18.6 END STAGE RENAL DISEASE: ICD-10-CM

## 2020-07-13 DIAGNOSIS — N17.9 ACUTE KIDNEY FAILURE, UNSPECIFIED: ICD-10-CM

## 2020-07-13 DIAGNOSIS — D63.1 ANEMIA IN CHRONIC KIDNEY DISEASE: ICD-10-CM

## 2020-07-13 DIAGNOSIS — E83.51 HYPOCALCEMIA: ICD-10-CM

## 2020-07-13 DIAGNOSIS — N25.81 SECONDARY HYPERPARATHYROIDISM OF RENAL ORIGIN: ICD-10-CM

## 2020-07-13 DIAGNOSIS — E83.39 OTHER DISORDERS OF PHOSPHORUS METABOLISM: ICD-10-CM

## 2020-07-13 DIAGNOSIS — R94.31 ABNORMAL ELECTROCARDIOGRAM [ECG] [EKG]: ICD-10-CM

## 2020-07-13 DIAGNOSIS — E78.5 HYPERLIPIDEMIA, UNSPECIFIED: ICD-10-CM

## 2020-07-13 DIAGNOSIS — I12.0 HYPERTENSIVE CHRONIC KIDNEY DISEASE WITH STAGE 5 CHRONIC KIDNEY DISEASE OR END STAGE RENAL DISEASE: ICD-10-CM

## 2020-07-13 DIAGNOSIS — K29.60 OTHER GASTRITIS WITHOUT BLEEDING: ICD-10-CM

## 2020-07-13 DIAGNOSIS — Z91.14 PATIENT'S OTHER NONCOMPLIANCE WITH MEDICATION REGIMEN: ICD-10-CM

## 2020-07-13 PROBLEM — K21.9 GASTRO-ESOPHAGEAL REFLUX DISEASE WITHOUT ESOPHAGITIS: Chronic | Status: ACTIVE | Noted: 2018-11-11

## 2021-03-02 PROBLEM — N18.9 CHRONIC KIDNEY DISEASE, UNSPECIFIED: Chronic | Status: ACTIVE | Noted: 2020-07-07

## 2021-03-14 DIAGNOSIS — Z01.818 ENCOUNTER FOR OTHER PREPROCEDURAL EXAMINATION: ICD-10-CM

## 2021-03-14 PROBLEM — Z00.00 ENCOUNTER FOR PREVENTIVE HEALTH EXAMINATION: Status: ACTIVE | Noted: 2021-03-14

## 2021-03-15 ENCOUNTER — APPOINTMENT (OUTPATIENT)
Dept: DISASTER EMERGENCY | Facility: CLINIC | Age: 46
End: 2021-03-15

## 2021-03-16 LAB — SARS-COV-2 N GENE NPH QL NAA+PROBE: NOT DETECTED

## 2021-03-23 ENCOUNTER — APPOINTMENT (OUTPATIENT)
Dept: DISASTER EMERGENCY | Facility: CLINIC | Age: 46
End: 2021-03-23

## 2021-03-24 LAB — SARS-COV-2 N GENE NPH QL NAA+PROBE: NOT DETECTED

## 2021-03-25 ENCOUNTER — TRANSCRIPTION ENCOUNTER (OUTPATIENT)
Age: 46
End: 2021-03-25

## 2021-03-25 VITALS
SYSTOLIC BLOOD PRESSURE: 125 MMHG | HEART RATE: 72 BPM | DIASTOLIC BLOOD PRESSURE: 79 MMHG | OXYGEN SATURATION: 99 % | TEMPERATURE: 98 F | HEIGHT: 67 IN | WEIGHT: 146.39 LBS | RESPIRATION RATE: 16 BRPM

## 2021-03-25 NOTE — ASU PATIENT PROFILE, ADULT - PSH
History of appendectomy    History of surgery  rt chest catheter  History of surgery  back surgery  History of surgery  left foot

## 2021-03-25 NOTE — PRE-OP CHECKLIST - ASSESSMENT, HISTORY & PHYSICAL COMPLETED AND ON MEDICAL RECORD
How Severe Are Your Spot(S)?: mild What Type Of Note Output Would You Prefer (Optional)?: Standard Output What Is The Reason For Today's Visit?: Full Body Skin Examination What Is The Reason For Today's Visit? (Being Monitored For X): concerning skin lesions on an annual basis Additional History: He would like a full skin exam today. done

## 2021-03-26 ENCOUNTER — OUTPATIENT (OUTPATIENT)
Dept: INPATIENT UNIT | Facility: HOSPITAL | Age: 46
LOS: 1 days | Discharge: HOME | End: 2021-03-26
Payer: COMMERCIAL

## 2021-03-26 VITALS
SYSTOLIC BLOOD PRESSURE: 115 MMHG | HEART RATE: 68 BPM | OXYGEN SATURATION: 100 % | DIASTOLIC BLOOD PRESSURE: 77 MMHG | RESPIRATION RATE: 16 BRPM

## 2021-03-26 DIAGNOSIS — Z98.890 OTHER SPECIFIED POSTPROCEDURAL STATES: Chronic | ICD-10-CM

## 2021-03-26 DIAGNOSIS — Z90.49 ACQUIRED ABSENCE OF OTHER SPECIFIED PARTS OF DIGESTIVE TRACT: Chronic | ICD-10-CM

## 2021-03-26 PROCEDURE — 28285 REPAIR OF HAMMERTOE: CPT | Mod: F9

## 2021-03-26 PROCEDURE — 76000 FLUOROSCOPY <1 HR PHYS/QHP: CPT

## 2021-03-26 PROCEDURE — C1713: CPT

## 2021-03-26 RX ORDER — OXYCODONE AND ACETAMINOPHEN 5; 325 MG/1; MG/1
1 TABLET ORAL EVERY 4 HOURS
Refills: 0 | Status: DISCONTINUED | OUTPATIENT
Start: 2021-03-26 | End: 2021-03-26

## 2021-03-26 RX ORDER — HYDROMORPHONE HYDROCHLORIDE 2 MG/ML
1 INJECTION INTRAMUSCULAR; INTRAVENOUS; SUBCUTANEOUS EVERY 4 HOURS
Refills: 0 | Status: DISCONTINUED | OUTPATIENT
Start: 2021-03-26 | End: 2021-03-26

## 2021-03-26 RX ORDER — ONDANSETRON 8 MG/1
4 TABLET, FILM COATED ORAL EVERY 6 HOURS
Refills: 0 | Status: DISCONTINUED | OUTPATIENT
Start: 2021-03-26 | End: 2021-03-26

## 2021-03-26 RX ORDER — OXYCODONE AND ACETAMINOPHEN 5; 325 MG/1; MG/1
2 TABLET ORAL EVERY 6 HOURS
Refills: 0 | Status: DISCONTINUED | OUTPATIENT
Start: 2021-03-26 | End: 2021-03-26

## 2021-03-26 RX ADMIN — OXYCODONE AND ACETAMINOPHEN 1 TABLET(S): 5; 325 TABLET ORAL at 12:46

## 2021-03-26 RX ADMIN — OXYCODONE AND ACETAMINOPHEN 1 TABLET(S): 5; 325 TABLET ORAL at 13:30

## 2021-03-26 NOTE — PACU DISCHARGE NOTE - COMMENTS
Discharge instruction and medication provided to the Patient, Verbalized understanding. Pt left the recovery room on wheelchair accompanied by the PCA. Pt is being escorted home by his wife.  Pt was provided cane and surgical shoe, cane teaching provided.

## 2021-06-14 ENCOUNTER — EMERGENCY (EMERGENCY)
Facility: HOSPITAL | Age: 46
LOS: 0 days | Discharge: HOME | End: 2021-06-14
Attending: EMERGENCY MEDICINE | Admitting: EMERGENCY MEDICINE
Payer: COMMERCIAL

## 2021-06-14 VITALS
OXYGEN SATURATION: 100 % | DIASTOLIC BLOOD PRESSURE: 83 MMHG | RESPIRATION RATE: 17 BRPM | HEART RATE: 96 BPM | TEMPERATURE: 98 F | WEIGHT: 139.99 LBS | HEIGHT: 67 IN | SYSTOLIC BLOOD PRESSURE: 177 MMHG

## 2021-06-14 DIAGNOSIS — K21.9 GASTRO-ESOPHAGEAL REFLUX DISEASE WITHOUT ESOPHAGITIS: ICD-10-CM

## 2021-06-14 DIAGNOSIS — Z98.890 OTHER SPECIFIED POSTPROCEDURAL STATES: Chronic | ICD-10-CM

## 2021-06-14 DIAGNOSIS — N18.9 CHRONIC KIDNEY DISEASE, UNSPECIFIED: ICD-10-CM

## 2021-06-14 DIAGNOSIS — Z90.49 ACQUIRED ABSENCE OF OTHER SPECIFIED PARTS OF DIGESTIVE TRACT: Chronic | ICD-10-CM

## 2021-06-14 DIAGNOSIS — R79.9 ABNORMAL FINDING OF BLOOD CHEMISTRY, UNSPECIFIED: ICD-10-CM

## 2021-06-14 DIAGNOSIS — Z79.899 OTHER LONG TERM (CURRENT) DRUG THERAPY: ICD-10-CM

## 2021-06-14 LAB
BASE EXCESS BLDV CALC-SCNC: -5.8 MMOL/L — LOW (ref -2–2)
CA-I SERPL-SCNC: 1.3 MMOL/L — SIGNIFICANT CHANGE UP (ref 1.12–1.3)
GAS PNL BLDV: 140 MMOL/L — SIGNIFICANT CHANGE UP (ref 136–145)
GAS PNL BLDV: SIGNIFICANT CHANGE UP
HCO3 BLDV-SCNC: 20 MMOL/L — LOW (ref 22–29)
HCT VFR BLDA CALC: 25.2 % — LOW (ref 34–44)
HGB BLD CALC-MCNC: 8.2 G/DL — LOW (ref 14–18)
LACTATE BLDV-MCNC: 0.7 MMOL/L — SIGNIFICANT CHANGE UP (ref 0.5–1.6)
PCO2 BLDV: 40 MMHG — LOW (ref 41–51)
PH BLDV: 7.3 — SIGNIFICANT CHANGE UP (ref 7.26–7.43)
PO2 BLDV: 28 MMHG — SIGNIFICANT CHANGE UP (ref 20–40)
POTASSIUM BLDV-SCNC: 5.2 MMOL/L — SIGNIFICANT CHANGE UP (ref 3.3–5.6)
SAO2 % BLDV: 48 % — SIGNIFICANT CHANGE UP

## 2021-06-14 PROCEDURE — 93010 ELECTROCARDIOGRAM REPORT: CPT

## 2021-06-14 PROCEDURE — 99285 EMERGENCY DEPT VISIT HI MDM: CPT

## 2021-06-14 NOTE — ED PROVIDER NOTE - PHYSICAL EXAMINATION
Physical Exam    Vital Signs: I have reviewed the initial vital signs.  Constitutional: well-nourished, appears stated age, no acute distress  Eyes: Conjunctiva pink, Sclera clear  Cardiovascular: S1 and S2, regular rate, regular rhythm, well-perfused extremities, radial pulses equal and 2+ b/l.   Respiratory: unlabored respiratory effort, clear to auscultation bilaterally no wheezing, rales and rhonchi. pt is speaking full sentences. no accessory muscle use.   Gastrointestinal: soft, non-tender, nondistended abdomen, no pulsatile mass, normal bowl sounds, no rebound, no guarding, no organomegaly.   Musculoskeletal: supple neck, no lower extremity edema, no calf tenderness, FROM of b/l upper and lower extremities b/l.   Integumentary: warm, dry, no rash  Neurologic: awake, alert, steady gait.   Psychiatric: appropriate mood, appropriate affect

## 2021-06-14 NOTE — ED PROVIDER NOTE - NS ED MD DISPO DISCHARGE
Complex Repair And Graft Additional Text (Will Appearing After The Standard Complex Repair Text): The complex repair was not sufficient to completely close the primary defect. The remaining additional defect was repaired with the graft mentioned below.
Home
no

## 2021-06-14 NOTE — ED PROVIDER NOTE - ATTENDING CONTRIBUTION TO CARE
44 y/o male s/p renal transplant at [facility] 1 week ago p/w hyperkalemia noted on routine blood-work, denies modifying factors, denies associated complaints at present.    Old chart reviewed.  I have reviewed and agree with the initial nursing note, except as documented in my note.    VSS, awake, alert, non-toxic appearing, lying comfortably in stretcher, in NAD, chest CTAB, non-labored breathing, no w/r/r, +S1/S2, RRR, no m/r/g, abdomen soft, NT, ND, +BS, no peripheral edema or deformities, alert, clear speech and steady gait.

## 2021-06-14 NOTE — ED PROVIDER NOTE - PATIENT PORTAL LINK FT
You can access the FollowMyHealth Patient Portal offered by [facility] by registering at the following website: [email or web address]. By joining [employer]’s FollowMyHealth portal, you will also be able to view your health information using other applications (apps) compatible with our system.

## 2021-06-14 NOTE — ED PROVIDER NOTE - PROGRESS NOTE DETAILS
FF: ekg performed. vbg drawn and potassium is within normal limits. pt advised of return precautions discussed at bedside. agreeable to dc. f/u with nephrologist and transplant surgeon. agreeable to dc.

## 2021-06-14 NOTE — ED PROVIDER NOTE - OBJECTIVE STATEMENT
44 y/o male with a PMH of left kidney transplant performed on 06/03 at [facility] by Dr. Lyle and nephrologist is Dr. Wilson on cellcept, prograft, acyclovir, clotrimazole, and nifedipine presents to the ED for evaluation of elevated potassium. pt reports he gets blood work drawn twice a week since the kidney transplant, and he was called today because the potassium during the most recent blood draw was 6.8. pt denies chest pain, sob, abdominal pain, weakness, dizziness, n/v/d/c, urinary symptoms, fever, chills, or cough.

## 2021-06-14 NOTE — ED ADULT TRIAGE NOTE - CHIEF COMPLAINT QUOTE
You have a stent in place  Your stone was not addressed and you will absolutely need to return to the office to arrange interval stone surgery  Failure to followup can result in dangerous complication as the ureteral stent cannot remain in place indefinitely  It is common to have some mild symptoms from the stent placement  Pain with urination, feeling of needing to urinate frequency or urgently, flank discomfort or blood in the urine  Utilize the medications provided (flomax, ditropan, pyridium) as well as ibuprofen/motrin for pain control  Hydrate liberally with oral fluids  Ureteroscopy   WHAT YOU NEED TO KNOW:   A ureteroscopy is a procedure to examine in the inside of your urinary tract, which includes your urethra, bladder, ureters, and kidneys  A ureteroscope is a small, thin tube with a light and camera on the end  Ureteroscopy can help your healthcare provider diagnose and treat problems in your urinary tract, such as kidney stones  DISCHARGE INSTRUCTIONS:   Medicine:   · Antibiotics  may be given to treat or prevent an infection  · Take your medicine as directed  Contact your healthcare provider if you think your medicine is not helping or if you have side effects  Tell him or her if you are allergic to any medicine  Keep a list of the medicines, vitamins, and herbs you take  Include the amounts, and when and why you take them  Bring the list or the pill bottles to follow-up visits  Carry your medicine list with you in case of an emergency  Follow up with your healthcare provider as directed:  Write down your questions so you remember to ask them during your visits  Drink liquids as directed  Liquids can help prevent kidney stones and urinary tract infections  Drink water and limit the amount of caffeine you drink  Caffeine may be found in coffee, tea, soda, sports drinks, and foods  Ask your healthcare provider how much liquid to drink each day    Contact your healthcare provider if: · You have a fever  · You cannot urinate  · You have blood in your urine  · You are vomiting  · You have pain in your abdomen or side  · You have questions or concerns about your condition or care  © Copyright [employer] 2018 Information is for End User's use only and may not be sold, redistributed or otherwise used for commercial purposes  All illustrations and images included in CareNotes® are the copyrighted property of A D A M , Inc  or [facility]   The above information is an  only  It is not intended as medical advice for individual conditions or treatments  Talk to your doctor, nurse or pharmacist before following any medical regimen to see if it is safe and effective for you  "I had kidney transplant last week. My doctor sent me here because my potassium level is high." "I had kidney transplant last week. My doctor sent me here because my potassium level is high & he needs it re-checked." Pt denies any discomfort.

## 2022-02-10 ENCOUNTER — TRANSCRIPTION ENCOUNTER (OUTPATIENT)
Age: 47
End: 2022-02-10

## 2022-03-16 ENCOUNTER — NON-APPOINTMENT (OUTPATIENT)
Age: 47
End: 2022-03-16

## 2022-04-04 NOTE — PRE-OP CHECKLIST - AS BP NONINV SITE
Lennie Cavazos was seen and treated in our emergency department on 4/4/2022. He may return to school on 04/07/2022. If you have any questions or concerns, please don't hesitate to call.       Vivienne Yuen PA-C
left upper arm

## 2022-04-07 NOTE — ED PROVIDER NOTE - PROGRESS NOTE DETAILS
The patient has been examined and the H&P has been reviewed:    I concur with the findings and no changes have occurred since H&P was written.    Surgery risks, benefits and alternative options discussed and understood by patient/family.          There are no hospital problems to display for this patient.     Dr. Troy miles

## 2022-08-16 ENCOUNTER — APPOINTMENT (OUTPATIENT)
Dept: NEUROLOGY | Facility: CLINIC | Age: 47
End: 2022-08-16

## 2022-08-22 ENCOUNTER — RX RENEWAL (OUTPATIENT)
Age: 47
End: 2022-08-22

## 2022-08-22 RX ORDER — GABAPENTIN 300 MG/1
300 CAPSULE ORAL
Qty: 180 | Refills: 0 | Status: ACTIVE | COMMUNITY
Start: 2022-08-22 | End: 1900-01-01

## 2022-10-02 NOTE — ED ADULT NURSE NOTE - NSIMPLEMENTINTERV_GEN_ALL_ED
Home Implemented All Universal Safety Interventions:  [place] to call system. Call bell, personal items and telephone within reach. Instruct patient to call for assistance. Room bathroom lighting operational. Non-slip footwear when patient is off stretcher. Physically safe environment: no spills, clutter or unnecessary equipment. Stretcher in lowest position, wheels locked, appropriate side rails in place.

## 2022-10-04 ENCOUNTER — APPOINTMENT (OUTPATIENT)
Dept: NEUROLOGY | Facility: CLINIC | Age: 47
End: 2022-10-04

## 2022-10-04 VITALS — WEIGHT: 152 LBS | BODY MASS INDEX: 23.86 KG/M2 | HEIGHT: 67 IN

## 2022-10-04 VITALS — DIASTOLIC BLOOD PRESSURE: 87 MMHG | SYSTOLIC BLOOD PRESSURE: 123 MMHG | HEART RATE: 94 BPM

## 2022-10-04 PROCEDURE — 99213 OFFICE O/P EST LOW 20 MIN: CPT

## 2022-10-04 NOTE — ASSESSMENT
[FreeTextEntry1] : Patient's gabapentin was renewed we will follow-up with him in 5 to 6 months barring problems.\par \par \par ZEE Delgado, PA,C \par Sudhakar Ceballos MD\par

## 2022-10-04 NOTE — PHYSICAL EXAM
[FreeTextEntry1] : Patient is a 47-year-old man who appears his stated age, well-developed and well-nourished in no acute distress.  He was alert and oriented, coherent relevant and appropriate.\par Gait is normal.

## 2022-10-04 NOTE — HISTORY OF PRESENT ILLNESS
[FreeTextEntry1] : Mr. Salinas is a 47-year-old man seen in the office today in neurological follow-up regarding his discomfort which is secondary to his polyneuropathy . \par  In March a kidney became available and on June 2, 2021  he did undergo a kidney transplant.\par He was having the most discomfort when he is trying to go to sleep . He did start a trial of gabapentin and is now\par taking 300mg twice daily and happy to report sleeping fitfully, tolerating medication well and is without complaint of side effect. He is no longer working at the [employer] and was approved for SSD due to his renal disease and related neuropathy , \par 01/12/2021  EMG/NCV LOWER : There is electrophysiologic evidence of a diffuse sensorimotor polyneuropathy bilaterally.\par 12/29/2020 EMG/NCV UPPER : There is electrophysiologic evidence of a diffuse sensorimotor polyneuropathy in both upper extremities.\par \par Consultation via telephone with Nephrologist, Dr. Nguyen' office , [place], at [facility], on 12/14/2022, confirmed Dosage maximum recommendations advise maximum doses with respect to patient's pain medications and these include gabapentin up to 600 mg, Lyrica up to 200 mg and duloxetine up to 60 mg daily .\par History\par Nephrologist Dr Nguyen and KRYS Ervin at [place] [phone]

## 2022-11-07 ENCOUNTER — APPOINTMENT (OUTPATIENT)
Dept: ORTHOPEDIC SURGERY | Facility: CLINIC | Age: 47
End: 2022-11-07
Payer: COMMERCIAL

## 2022-11-07 VITALS — WEIGHT: 152 LBS | BODY MASS INDEX: 23.86 KG/M2 | HEIGHT: 67 IN

## 2022-11-07 DIAGNOSIS — S62.112A DISPLACED FRACTURE OF TRIQUETRUM [CUNEIFORM] BONE, LEFT WRIST, INITIAL ENCOUNTER FOR CLOSED FRACTURE: ICD-10-CM

## 2022-11-07 PROCEDURE — 99203 OFFICE O/P NEW LOW 30 MIN: CPT

## 2022-11-07 PROCEDURE — 73110 X-RAY EXAM OF WRIST: CPT | Mod: LT

## 2022-11-07 PROCEDURE — 99213 OFFICE O/P EST LOW 20 MIN: CPT

## 2022-11-07 RX ORDER — OXYCODONE AND ACETAMINOPHEN 5; 325 MG/1; MG/1
5-325 TABLET ORAL
Qty: 14 | Refills: 0 | Status: ACTIVE | COMMUNITY
Start: 2022-11-07 | End: 1900-01-01

## 2022-11-07 NOTE — HISTORY OF PRESENT ILLNESS
[de-identified] :  Patient is a 47-year-old male who reports to the office for evaluation of his left wrist pain since yesterday, 11/06/2022.  He states that he was getting up off of a chair when he accidentally fell landing on outstretched left hand.  He went to [facility] where they performed x-rays and told patient that he has a fracture.  He admits to wrist swelling ever since.  Range of motion and palpating certain areas of the wrist aggravate the patient's pain.  Denies any numbness or tingling.

## 2022-11-07 NOTE — PHYSICAL EXAM
[Left] : left hand [Dorsal Wrist] : dorsal wrist [5___] : grasp 5[unfilled]/5 [] : no laceration/abrasion [FreeTextEntry9] :   Limited ROM of wrist secondary to swelling /pain

## 2022-11-07 NOTE — DISCUSSION/SUMMARY
[de-identified] :   Patient was placed in a cock-up wrist brace for support/stability.  I advised patient to keep this on at all times including during sleep.  He may take it are it off for hygiene purposes.  He understands and will comply.\par \par Patient recently had a kidney transplant and cannot take NSAIDs.  Percocet 5-325 mg was sent to the patient's pharmacy to help alleviate his symptoms.\par \par The patient was advised to rest/ice the area and can alternate with warm compresses as needed.  Advised elevation to help with swelling.  Patient will follow-up in 3-4 weeks for repeat x-rays and further evaluation.  All of the patient's questions/concerns were answered in detail.  \par \par The patient was seen under the supervision of Dr. Elliott.\par

## 2022-11-07 NOTE — IMAGING
[de-identified] :  X-rays taken of the patient's left wrist in the office today revealed an avulsion fracture of the triquetral bone.  Otherwise, no other significant abnormalities are seen.

## 2022-11-30 ENCOUNTER — APPOINTMENT (OUTPATIENT)
Dept: ORTHOPEDIC SURGERY | Facility: CLINIC | Age: 47
End: 2022-11-30

## 2023-04-11 ENCOUNTER — APPOINTMENT (OUTPATIENT)
Dept: NEUROLOGY | Facility: CLINIC | Age: 48
End: 2023-04-11

## 2023-04-19 ENCOUNTER — APPOINTMENT (OUTPATIENT)
Dept: NEUROLOGY | Facility: CLINIC | Age: 48
End: 2023-04-19
Payer: MEDICARE

## 2023-04-19 VITALS
WEIGHT: 152 LBS | HEIGHT: 67 IN | BODY MASS INDEX: 23.86 KG/M2 | HEART RATE: 66 BPM | SYSTOLIC BLOOD PRESSURE: 126 MMHG | DIASTOLIC BLOOD PRESSURE: 78 MMHG

## 2023-04-19 PROCEDURE — 99213 OFFICE O/P EST LOW 20 MIN: CPT

## 2023-04-19 NOTE — REVIEW OF SYSTEMS
[Numbness] : numbness [Tingling] : tingling [Abnormal Sensation] : an abnormal sensation [Hypersensitivity] : hypersensitivity [Negative] : Respiratory

## 2023-04-19 NOTE — ASSESSMENT
[FreeTextEntry1] : 47 year old male with chronic neuropathy.  He will continue to take gabapentin 300 mg bid and f/u in 6 months for re evaluation.  He continues to undergo regular blood work and we will request a copy of most recent at his next visit.\par \par I personally reviewed with the PA, this patient's history and physical exam findings, as documented above. I have discussed the relevant areas of concern, having direct implications to the presenting problems and illnesses, and I have personally examined all pertinent and positive and negative findings, which impact on the prior neurological treatment. \par \par \par Leihga Rahman, MS, PA-C\par Demarco Ceballos MD\par

## 2023-04-19 NOTE — HISTORY OF PRESENT ILLNESS
[FreeTextEntry1] : TODAY: I had the pleasure of seeing Mr. Salinas today in follow up.   His previous history and physical findings have been reviewed.\par \par He is under our care for chronic neuropathy which he is receiving continuing active treatment for. He states he is experiencing a little more pain as of late which he attributes to the cold weather.  He does continue to be as active as possible even though ambulation does increase his pain at times.  He has been managed on a regimen of gabapentin 300 mg BID providing him 50% relief.  He experiences no adverse side effects and we will continue as is without change.\par \par Of note he is no longer working and is retired from [employer] due to his condition.

## 2023-04-19 NOTE — PHYSICAL EXAM
[General Appearance - Alert] : alert [General Appearance - In No Acute Distress] : in no acute distress [General Appearance - Well Nourished] : well nourished [General Appearance - Well Developed] : well developed [General Appearance - Well-Appearing] : healthy appearing [Oriented To Time, Place, And Person] : oriented to person, place, and time [Impaired Insight] : insight and judgment were intact [Affect] : the affect was normal [Mood] : the mood was normal [Memory Recent] : recent memory was not impaired [Memory Remote] : remote memory was not impaired [Person] : oriented to person [Place] : oriented to place [Time] : oriented to time [Short Term Intact] : short term memory intact [Remote Intact] : remote memory intact [Registration Intact] : recent registration memory intact [Cranial Nerves Optic (II)] : visual acuity intact bilaterally,  visual fields full to confrontation, pupils equal round and reactive to light [Cranial Nerves Oculomotor (III)] : extraocular motion intact [Cranial Nerves Facial (VII)] : face symmetrical [Cranial Nerves Accessory (XI - Cranial And Spinal)] : head turning and shoulder shrug symmetric [Involuntary Movements] : no involuntary movements were seen

## 2023-10-25 ENCOUNTER — APPOINTMENT (OUTPATIENT)
Dept: NEUROLOGY | Facility: CLINIC | Age: 48
End: 2023-10-25
Payer: MEDICARE

## 2023-10-25 VITALS — WEIGHT: 145 LBS | BODY MASS INDEX: 22.76 KG/M2 | HEIGHT: 67 IN

## 2023-10-25 DIAGNOSIS — G62.9 POLYNEUROPATHY, UNSPECIFIED: ICD-10-CM

## 2023-10-25 PROCEDURE — 99213 OFFICE O/P EST LOW 20 MIN: CPT

## 2023-10-25 RX ORDER — GABAPENTIN 300 MG/1
300 CAPSULE ORAL
Qty: 180 | Refills: 1 | Status: ACTIVE | COMMUNITY
Start: 2022-10-04 | End: 1900-01-01

## 2024-04-19 ENCOUNTER — APPOINTMENT (OUTPATIENT)
Dept: NEUROLOGY | Facility: CLINIC | Age: 49
End: 2024-04-19

## 2024-04-25 ENCOUNTER — APPOINTMENT (OUTPATIENT)
Dept: NEUROLOGY | Facility: CLINIC | Age: 49
End: 2024-04-25

## 2024-07-29 ENCOUNTER — NON-APPOINTMENT (OUTPATIENT)
Age: 49
End: 2024-07-29

## 2024-07-31 ENCOUNTER — EMERGENCY (EMERGENCY)
Facility: HOSPITAL | Age: 49
LOS: 0 days | Discharge: HOME | End: 2024-07-31
Attending: EMERGENCY MEDICINE
Payer: MEDICARE

## 2024-07-31 VITALS
OXYGEN SATURATION: 100 % | SYSTOLIC BLOOD PRESSURE: 124 MMHG | TEMPERATURE: 98 F | RESPIRATION RATE: 18 BRPM | DIASTOLIC BLOOD PRESSURE: 72 MMHG | HEART RATE: 84 BPM

## 2024-07-31 DIAGNOSIS — R51.9 HEADACHE, UNSPECIFIED: ICD-10-CM

## 2024-07-31 DIAGNOSIS — J32.9 CHRONIC SINUSITIS, UNSPECIFIED: ICD-10-CM

## 2024-07-31 DIAGNOSIS — Z98.890 OTHER SPECIFIED POSTPROCEDURAL STATES: Chronic | ICD-10-CM

## 2024-07-31 DIAGNOSIS — R09.81 NASAL CONGESTION: ICD-10-CM

## 2024-07-31 DIAGNOSIS — Z90.49 ACQUIRED ABSENCE OF OTHER SPECIFIED PARTS OF DIGESTIVE TRACT: Chronic | ICD-10-CM

## 2024-07-31 LAB
ALBUMIN SERPL ELPH-MCNC: 4.3 G/DL — SIGNIFICANT CHANGE UP (ref 3.5–5.2)
ALP SERPL-CCNC: 59 U/L — SIGNIFICANT CHANGE UP (ref 30–115)
ALT FLD-CCNC: 14 U/L — SIGNIFICANT CHANGE UP (ref 0–41)
ANION GAP SERPL CALC-SCNC: 11 MMOL/L — SIGNIFICANT CHANGE UP (ref 7–14)
AST SERPL-CCNC: 14 U/L — SIGNIFICANT CHANGE UP (ref 0–41)
BASOPHILS # BLD AUTO: 0.02 K/UL — SIGNIFICANT CHANGE UP (ref 0–0.2)
BASOPHILS NFR BLD AUTO: 0.2 % — SIGNIFICANT CHANGE UP (ref 0–1)
BILIRUB SERPL-MCNC: 0.2 MG/DL — SIGNIFICANT CHANGE UP (ref 0.2–1.2)
BUN SERPL-MCNC: 26 MG/DL — HIGH (ref 10–20)
CALCIUM SERPL-MCNC: 9.5 MG/DL — SIGNIFICANT CHANGE UP (ref 8.4–10.5)
CHLORIDE SERPL-SCNC: 109 MMOL/L — SIGNIFICANT CHANGE UP (ref 98–110)
CO2 SERPL-SCNC: 19 MMOL/L — SIGNIFICANT CHANGE UP (ref 17–32)
CREAT SERPL-MCNC: 2.1 MG/DL — HIGH (ref 0.7–1.5)
EGFR: 38 ML/MIN/1.73M2 — LOW
EOSINOPHIL # BLD AUTO: 0.24 K/UL — SIGNIFICANT CHANGE UP (ref 0–0.7)
EOSINOPHIL NFR BLD AUTO: 2.6 % — SIGNIFICANT CHANGE UP (ref 0–8)
GLUCOSE SERPL-MCNC: 106 MG/DL — HIGH (ref 70–99)
HCT VFR BLD CALC: 34.5 % — LOW (ref 42–52)
HGB BLD-MCNC: 10.8 G/DL — LOW (ref 14–18)
IMM GRANULOCYTES NFR BLD AUTO: 0.3 % — SIGNIFICANT CHANGE UP (ref 0.1–0.3)
LYMPHOCYTES # BLD AUTO: 0.85 K/UL — LOW (ref 1.2–3.4)
LYMPHOCYTES # BLD AUTO: 9.1 % — LOW (ref 20.5–51.1)
MCHC RBC-ENTMCNC: 30.3 PG — SIGNIFICANT CHANGE UP (ref 27–31)
MCHC RBC-ENTMCNC: 31.3 G/DL — LOW (ref 32–37)
MCV RBC AUTO: 96.6 FL — HIGH (ref 80–94)
MONOCYTES # BLD AUTO: 0.81 K/UL — HIGH (ref 0.1–0.6)
MONOCYTES NFR BLD AUTO: 8.6 % — SIGNIFICANT CHANGE UP (ref 1.7–9.3)
NEUTROPHILS # BLD AUTO: 7.43 K/UL — HIGH (ref 1.4–6.5)
NEUTROPHILS NFR BLD AUTO: 79.2 % — HIGH (ref 42.2–75.2)
NRBC # BLD: 0 /100 WBCS — SIGNIFICANT CHANGE UP (ref 0–0)
PLATELET # BLD AUTO: 320 K/UL — SIGNIFICANT CHANGE UP (ref 130–400)
PMV BLD: 9.3 FL — SIGNIFICANT CHANGE UP (ref 7.4–10.4)
POTASSIUM SERPL-MCNC: 4.9 MMOL/L — SIGNIFICANT CHANGE UP (ref 3.5–5)
POTASSIUM SERPL-SCNC: 4.9 MMOL/L — SIGNIFICANT CHANGE UP (ref 3.5–5)
PROT SERPL-MCNC: 7.2 G/DL — SIGNIFICANT CHANGE UP (ref 6–8)
RBC # BLD: 3.57 M/UL — LOW (ref 4.7–6.1)
RBC # FLD: 12.7 % — SIGNIFICANT CHANGE UP (ref 11.5–14.5)
SODIUM SERPL-SCNC: 139 MMOL/L — SIGNIFICANT CHANGE UP (ref 135–146)
WBC # BLD: 9.38 K/UL — SIGNIFICANT CHANGE UP (ref 4.8–10.8)
WBC # FLD AUTO: 9.38 K/UL — SIGNIFICANT CHANGE UP (ref 4.8–10.8)

## 2024-07-31 PROCEDURE — 70450 CT HEAD/BRAIN W/O DYE: CPT | Mod: 26,MC

## 2024-07-31 PROCEDURE — 36000 PLACE NEEDLE IN VEIN: CPT

## 2024-07-31 PROCEDURE — 99284 EMERGENCY DEPT VISIT MOD MDM: CPT | Mod: 25

## 2024-07-31 PROCEDURE — 80053 COMPREHEN METABOLIC PANEL: CPT

## 2024-07-31 PROCEDURE — 85025 COMPLETE CBC W/AUTO DIFF WBC: CPT

## 2024-07-31 PROCEDURE — 99284 EMERGENCY DEPT VISIT MOD MDM: CPT | Mod: FS

## 2024-07-31 PROCEDURE — 70450 CT HEAD/BRAIN W/O DYE: CPT | Mod: MC

## 2024-07-31 PROCEDURE — 36415 COLL VENOUS BLD VENIPUNCTURE: CPT

## 2024-07-31 RX ORDER — CETIRIZINE HYDROCHLORIDE AND PSEUDOEPHEDRINE HYDROCHLORIDE 5; 120 MG/1; MG/1
1 TABLET, FILM COATED, EXTENDED RELEASE ORAL
Qty: 7 | Refills: 0
Start: 2024-07-31 | End: 2024-08-06

## 2024-07-31 RX ORDER — SODIUM CHLORIDE 0.9 % (FLUSH) 0.9 %
1000 SYRINGE (ML) INJECTION ONCE
Refills: 0 | Status: COMPLETED | OUTPATIENT
Start: 2024-07-31 | End: 2024-07-31

## 2024-07-31 RX ADMIN — Medication 1000 MILLILITER(S): at 19:59

## 2024-07-31 NOTE — ED PROVIDER NOTE - NSFOLLOWUPCLINICS_GEN_ALL_ED_FT
[facility]  ENT  [address], 2nd [place] [address]  Phone: [phone]  Fax:

## 2024-07-31 NOTE — ED PROVIDER NOTE - ATTENDING APP SHARED VISIT CONTRIBUTION OF CARE
48-year-old male PMH as noted presenting for evaluation of frontal headache, nasal congestion and green discharge for the past week.  Patient appears very well, non-meningitic, normal phonation, normal oropharynx, there is tenderness to percussion over the maxillary and frontal sinuses, no facial cellulitis, speaking full sentences, lungs clear to auscultation bilaterally.  Patient overall appears very well, symptoms likely due to sinusitis, he was already started on antibiotics yesterday by urgent care clinic.  Vital signs were reviewed and are normal.  Patient was advised to use decongestants, imaging confirms diagnosis of sinusitis, patient stable for DC home, strict return precautions given.  Patient was reassured, verbalized understanding and is amenable with the plan.

## 2024-07-31 NOTE — ED PROVIDER NOTE - PATIENT PORTAL LINK FT
You can access the FollowMyHealth Patient Portal offered by [facility] by registering at the following website: [email or web address]. By joining [facility]’s FollowMyHealth portal, you will also be able to view your health information using other applications (apps) compatible with our system.

## 2024-07-31 NOTE — ED ADULT TRIAGE NOTE - CHIEF COMPLAINT QUOTE
Pt BIBEMS for headache for week with congestion after cutting grass without mask. Pt was on cefpodoxime and z pack from urgent care.

## 2024-07-31 NOTE — ED PROVIDER NOTE - OBJECTIVE STATEMENT
49 yo male with a pmh of renal transplant presents c/o headaches over the past week. pt states to note pressure around his eye and frontal region with nasal congestion. pt states to have been prescribed a zpak and cefpodoxime at urgent care yesterday. pt has not taken any decongestion medications. pt states he called his cousin that is a RN who told to come to the hospital to make sure he didn't have any bleeding in his brain. pt denies any other symptoms including fevers, chill, recent illness/travel, cough, abdominal pain, chest pain, or SOB.

## 2024-08-07 ENCOUNTER — APPOINTMENT (OUTPATIENT)
Dept: NEUROLOGY | Facility: CLINIC | Age: 49
End: 2024-08-07

## 2024-08-07 PROBLEM — E07.9 THYROID DISEASE: Status: ACTIVE | Noted: 2024-08-07

## 2024-08-07 PROCEDURE — 99213 OFFICE O/P EST LOW 20 MIN: CPT

## 2024-08-07 NOTE — ASSESSMENT
[FreeTextEntry1] : 48 year old male with chronic neuropathy to bilateral upper and lower extremities, symptomatic predominantly to his LE.  Today we will add Gabapentin 100mg to his regimen of gabapentin 300 mg bid for TDD of 400mg. He was given scripts for lab work and will RTO in 1 month. Patient will bring paperwork for DMV plaque to next visit.   Supervising Physician : Demarco Ceballos MD

## 2024-08-07 NOTE — HISTORY OF PRESENT ILLNESS
[FreeTextEntry1] : TODAY: I had the pleasure of seeing Mr. Salinas  in our office for follow up.   His previous history and physical findings have been reviewed.  Mr. Salinas is a pleasant 48 year old male who remains under our care for chronic neuropathy. Patient was last seen in our office 10.25.23, previously under the care of KRYS Rahman. For management of his neuropathic pain, he has remained on a regimen of Gabapentin 300mg 1 tab twice daily which he previously noted to provide 50% relief. EMG testing of the upper and lower extremities done in 2020 and 2021 showed diffuse polyneuropathy to bilateral upper and lower extremities. Patient states his neuropathy symptoms first began after dialysis following transplant surgery [id number]. Today he reports some worsening in his symptoms with increased intensity of " burning " to the bottoms of his feet. We discussed getting lab work including a polyneuropathy profile A1c CBC and CMP to look for systemic / nutritional causes that may be worsening his symptoms and will increase his Gabapentin by 100mg. Patient was agreeable and denies any new neurologic symptom, medications or diagnoses with other providers.

## 2024-08-07 NOTE — DATA REVIEWED
[de-identified] : 01/12/2021  EMG/NCV LOWER : There is electrophysiologic evidence of a diffuse sensorimotor polyneuropathy bilaterally.\par  12/29/2020 EMG/NCV UPPER : There is electrophysiologic evidence of a diffuse sensorimotor polyneuropathy in both upper extremities.\par

## 2024-09-25 ENCOUNTER — APPOINTMENT (OUTPATIENT)
Dept: NEUROLOGY | Facility: CLINIC | Age: 49
End: 2024-09-25
Payer: MEDICARE

## 2024-09-25 VITALS
SYSTOLIC BLOOD PRESSURE: 171 MMHG | HEIGHT: 67 IN | HEART RATE: 69 BPM | BODY MASS INDEX: 22.76 KG/M2 | DIASTOLIC BLOOD PRESSURE: 83 MMHG | WEIGHT: 145 LBS

## 2024-09-25 DIAGNOSIS — G62.9 POLYNEUROPATHY, UNSPECIFIED: ICD-10-CM

## 2024-09-25 PROCEDURE — 99214 OFFICE O/P EST MOD 30 MIN: CPT

## 2024-09-25 RX ORDER — GABAPENTIN 100 MG/1
100 CAPSULE ORAL DAILY
Qty: 90 | Refills: 0 | Status: ACTIVE | COMMUNITY
Start: 2024-09-25 | End: 1900-01-01

## 2024-09-25 NOTE — DATA REVIEWED
[de-identified] : 01/12/2021  EMG/NCV LOWER : There is electrophysiologic evidence of a diffuse sensorimotor polyneuropathy bilaterally.\par  12/29/2020 EMG/NCV UPPER : There is electrophysiologic evidence of a diffuse sensorimotor polyneuropathy in both upper extremities.\par

## 2024-09-25 NOTE — HISTORY OF PRESENT ILLNESS
[FreeTextEntry1] : TODAY: I had the pleasure of seeing Mr. Salinas  in our office for follow up.   His previous history and physical findings have been reviewed.  Mr. Salinas is a pleasant 49 year old male who remains under our care for chronic neuropathy. For management of his neuropathic pain, he has remained on a regimen of Gabapentin 300mg 1 tab twice daily. EMG testing of the upper and lower extremities done in 2020 and 2021 showed diffuse polyneuropathy to bilateral upper and lower extremities. Patient states his neuropathy symptoms first began after dialysis following transplant surgery [id number]. At his last visit he reported worsening in his symptoms with increased intensity of " burning " to the bottoms of his feet. Today we reviewed his lab work which revealed an elevated potassium level of 5.8, A1c of 5.8 and slight anemia with RBC of 3.78L, Hgb 11.3 L and Hct 35.2. Patient was advised to share these findings with his PCP and was educated on proper diet and exercise to promote overall health and prevent comorbidity. Patient requests updated EMG testing of the upper and lower extremities which will be ordered today as well as physical therapy for balance gait training and fall prevention. Patient was agreeable and denies any other new or progressive neurologic symptoms, medications or diagnoses with other providers.

## 2024-09-25 NOTE — ASSESSMENT
[FreeTextEntry1] : 49  year old male with worsening chronic neuropathy to bilateral upper and lower extremities. Today we reviewed his Lab testing noted above which he will discuss with his PCP.  He will go for updated EMG testing of the UE and LE, will begin physical therapy for balance and gait training and we will add Gabapentin 100mg to his existing Rx of Gabapentin 300mg BID for symptomatic control. He will RTO in 1-2 months. Letter provided during todays visit.   Supervising Physician : Demarco Ceballos MD

## 2024-11-06 NOTE — ED PROVIDER NOTE - IV ALTEPLASE EXCL ABS HIDDEN
Kierra Nunes  Identified pt with two pt identifiers(name and ).  Chief Complaint   Patient presents with    Hypertension    Weight Management       1. Have you been to the ER, urgent care clinic since your last visit?  Hospitalized since your last visit? NO    2. Have you seen or consulted any other health care providers outside of the [facility] since your last visit?  Include any pap smears or colon screening. NO      Provider notified of reason for visit, vitals and flowsheets obtained on patients.     Patient received paperwork for advance directive during previous visit but has not completed at this time     Reviewed record In preparation for visit, huddled with provider and have obtained necessary documentation      Health Maintenance Due   Topic    Diabetic retinal exam     A1C test (Diabetic or Prediabetic)        Wt Readings from Last 3 Encounters:   24 65.7 kg (144 lb 12.8 oz)   10/10/24 65.3 kg (144 lb)   24 65.3 kg (144 lb)     Temp Readings from Last 3 Encounters:   24 97.9 °F (36.6 °C) (Oral)   24 98.1 °F (36.7 °C) (Oral)   24 97.8 °F (36.6 °C) (Oral)     BP Readings from Last 3 Encounters:   24 116/64   24 124/85   24 117/77     Pulse Readings from Last 3 Encounters:   24 72   24 (!) 103   24 77          No data to display                  Learning Assessment:  :         2024    11:30 AM   [facility] AMB LEARNING ASSESSMENT   Primary Learner Patient   level of education GRADUATED HIGH SCHOOL OR GED   Primary Language ENGLISH   Learning Preference DEMONSTRATION   Answered By Patient   Relationship to Learner SELF       Fall Risk Assessment:  :         10/10/2024     3:26 PM 2024     2:07 PM 2024     1:59 PM 2024    11:47 AM 2022     2:18 PM 2021    11:12 AM 2021     1:27 PM   Amb Fall Risk Assessment and TUG Test   Do you feel unsteady or are you worried about falling?  no yes no no      2 or  show

## 2024-12-18 ENCOUNTER — APPOINTMENT (OUTPATIENT)
Dept: NEUROLOGY | Facility: CLINIC | Age: 49
End: 2024-12-18

## 2024-12-31 ENCOUNTER — RX RENEWAL (OUTPATIENT)
Age: 49
End: 2024-12-31

## 2025-01-31 ENCOUNTER — RX RENEWAL (OUTPATIENT)
Age: 50
End: 2025-01-31

## 2025-02-06 ENCOUNTER — APPOINTMENT (OUTPATIENT)
Dept: NEUROLOGY | Facility: CLINIC | Age: 50
End: 2025-02-06
Payer: MEDICARE

## 2025-02-06 VITALS
HEART RATE: 68 BPM | BODY MASS INDEX: 23.54 KG/M2 | DIASTOLIC BLOOD PRESSURE: 81 MMHG | WEIGHT: 150 LBS | SYSTOLIC BLOOD PRESSURE: 127 MMHG | HEIGHT: 67 IN

## 2025-02-06 DIAGNOSIS — G62.9 POLYNEUROPATHY, UNSPECIFIED: ICD-10-CM

## 2025-02-06 PROCEDURE — 99203 OFFICE O/P NEW LOW 30 MIN: CPT

## 2025-02-06 RX ORDER — TACROLIMUS 0.5 MG/1
0.5 CAPSULE ORAL
Refills: 0 | Status: ACTIVE | COMMUNITY

## 2025-02-06 RX ORDER — MYCOPHENOLIC ACID 180 MG/1
180 TABLET, DELAYED RELEASE ORAL
Refills: 0 | Status: ACTIVE | COMMUNITY

## 2025-02-06 RX ORDER — TACROLIMUS 1 MG/1
1 CAPSULE ORAL
Refills: 0 | Status: ACTIVE | COMMUNITY

## 2025-02-06 RX ORDER — DIPHENOXYLATE HYDROCHLORIDE AND ATROPINE SULFATE 2.5; .025 MG/1; MG/1
2.5-0.025 TABLET ORAL
Refills: 0 | Status: ACTIVE | COMMUNITY

## 2025-03-04 NOTE — ASU PATIENT PROFILE, ADULT - PRO INTERPRETER NEED 2
Patient is status post reduction of her dislocation.  No evidence of hip infection clinically.  Orthopedic surgery follow-up.   English

## 2025-04-15 ENCOUNTER — APPOINTMENT (OUTPATIENT)
Dept: NEUROLOGY | Facility: CLINIC | Age: 50
End: 2025-04-15
Payer: MEDICARE

## 2025-04-15 VITALS — WEIGHT: 155 LBS | BODY MASS INDEX: 24.33 KG/M2 | HEIGHT: 67 IN

## 2025-04-15 VITALS — DIASTOLIC BLOOD PRESSURE: 85 MMHG | HEART RATE: 71 BPM | SYSTOLIC BLOOD PRESSURE: 126 MMHG

## 2025-04-15 DIAGNOSIS — G62.9 POLYNEUROPATHY, UNSPECIFIED: ICD-10-CM

## 2025-04-15 PROCEDURE — 99213 OFFICE O/P EST LOW 20 MIN: CPT

## 2025-05-07 ENCOUNTER — APPOINTMENT (OUTPATIENT)
Dept: RADIATION ONCOLOGY | Facility: HOSPITAL | Age: 50
End: 2025-05-07
Payer: MEDICARE

## 2025-05-07 ENCOUNTER — OUTPATIENT (OUTPATIENT)
Dept: OUTPATIENT SERVICES | Facility: HOSPITAL | Age: 50
LOS: 1 days | End: 2025-05-07
Payer: MEDICARE

## 2025-05-07 VITALS
SYSTOLIC BLOOD PRESSURE: 125 MMHG | HEIGHT: 67 IN | BODY MASS INDEX: 25.6 KG/M2 | HEART RATE: 80 BPM | WEIGHT: 163.13 LBS | DIASTOLIC BLOOD PRESSURE: 83 MMHG | TEMPERATURE: 98.1 F | RESPIRATION RATE: 16 BRPM | OXYGEN SATURATION: 98 %

## 2025-05-07 DIAGNOSIS — Z98.890 OTHER SPECIFIED POSTPROCEDURAL STATES: Chronic | ICD-10-CM

## 2025-05-07 DIAGNOSIS — C44.629 SQUAMOUS CELL CARCINOMA OF SKIN OF LEFT UPPER LIMB, INCLUDING SHOULDER: ICD-10-CM

## 2025-05-07 DIAGNOSIS — N18.6 END STAGE RENAL DISEASE: ICD-10-CM

## 2025-05-07 DIAGNOSIS — Z90.49 ACQUIRED ABSENCE OF OTHER SPECIFIED PARTS OF DIGESTIVE TRACT: Chronic | ICD-10-CM

## 2025-05-07 DIAGNOSIS — Z00.8 ENCOUNTER FOR OTHER GENERAL EXAMINATION: ICD-10-CM

## 2025-05-07 PROCEDURE — 99204 OFFICE O/P NEW MOD 45 MIN: CPT

## 2025-05-08 ENCOUNTER — NON-APPOINTMENT (OUTPATIENT)
Age: 50
End: 2025-05-08

## 2025-05-09 ENCOUNTER — OUTPATIENT (OUTPATIENT)
Dept: OUTPATIENT SERVICES | Facility: HOSPITAL | Age: 50
LOS: 1 days | End: 2025-05-09
Payer: MEDICARE

## 2025-05-09 DIAGNOSIS — Z90.49 ACQUIRED ABSENCE OF OTHER SPECIFIED PARTS OF DIGESTIVE TRACT: Chronic | ICD-10-CM

## 2025-05-09 DIAGNOSIS — Z98.890 OTHER SPECIFIED POSTPROCEDURAL STATES: Chronic | ICD-10-CM

## 2025-05-09 DIAGNOSIS — Z00.8 ENCOUNTER FOR OTHER GENERAL EXAMINATION: ICD-10-CM

## 2025-05-09 PROCEDURE — 77290 THER RAD SIMULAJ FIELD CPLX: CPT

## 2025-05-09 PROCEDURE — 77334 RADIATION TREATMENT AID(S): CPT | Mod: 26

## 2025-05-09 PROCEDURE — 77334 RADIATION TREATMENT AID(S): CPT

## 2025-05-09 PROCEDURE — 77263 THER RADIOLOGY TX PLNG CPLX: CPT

## 2025-05-09 PROCEDURE — 77290 THER RAD SIMULAJ FIELD CPLX: CPT | Mod: 26

## 2025-05-12 DIAGNOSIS — Z00.8 ENCOUNTER FOR OTHER GENERAL EXAMINATION: ICD-10-CM

## 2025-05-15 ENCOUNTER — APPOINTMENT (OUTPATIENT)
Dept: PLASTIC SURGERY | Facility: CLINIC | Age: 50
End: 2025-05-15

## 2025-05-15 ENCOUNTER — OUTPATIENT (OUTPATIENT)
Dept: OUTPATIENT SERVICES | Facility: HOSPITAL | Age: 50
LOS: 1 days | End: 2025-05-15

## 2025-05-15 VITALS — HEIGHT: 67 IN | BODY MASS INDEX: 25.11 KG/M2 | WEIGHT: 160 LBS

## 2025-05-15 DIAGNOSIS — Z00.8 ENCOUNTER FOR OTHER GENERAL EXAMINATION: ICD-10-CM

## 2025-05-15 DIAGNOSIS — Z98.890 OTHER SPECIFIED POSTPROCEDURAL STATES: Chronic | ICD-10-CM

## 2025-05-15 DIAGNOSIS — Z90.49 ACQUIRED ABSENCE OF OTHER SPECIFIED PARTS OF DIGESTIVE TRACT: Chronic | ICD-10-CM

## 2025-05-15 PROCEDURE — 77334 RADIATION TREATMENT AID(S): CPT | Mod: 26

## 2025-05-15 PROCEDURE — 77300 RADIATION THERAPY DOSE PLAN: CPT | Mod: 26

## 2025-05-15 PROCEDURE — 99203 OFFICE O/P NEW LOW 30 MIN: CPT

## 2025-05-23 ENCOUNTER — LABORATORY RESULT (OUTPATIENT)
Age: 50
End: 2025-05-23

## 2025-05-28 ENCOUNTER — OUTPATIENT (OUTPATIENT)
Dept: OUTPATIENT SERVICES | Facility: HOSPITAL | Age: 50
LOS: 1 days | End: 2025-05-28
Payer: MEDICARE

## 2025-05-28 VITALS
DIASTOLIC BLOOD PRESSURE: 84 MMHG | HEART RATE: 68 BPM | HEIGHT: 67 IN | OXYGEN SATURATION: 98 % | TEMPERATURE: 98 F | WEIGHT: 160.06 LBS | SYSTOLIC BLOOD PRESSURE: 142 MMHG | RESPIRATION RATE: 16 BRPM

## 2025-05-28 DIAGNOSIS — Z98.890 OTHER SPECIFIED POSTPROCEDURAL STATES: Chronic | ICD-10-CM

## 2025-05-28 DIAGNOSIS — C44.92 SQUAMOUS CELL CARCINOMA OF SKIN, UNSPECIFIED: ICD-10-CM

## 2025-05-28 DIAGNOSIS — Z01.818 ENCOUNTER FOR OTHER PREPROCEDURAL EXAMINATION: ICD-10-CM

## 2025-05-28 DIAGNOSIS — Z90.49 ACQUIRED ABSENCE OF OTHER SPECIFIED PARTS OF DIGESTIVE TRACT: Chronic | ICD-10-CM

## 2025-05-28 DIAGNOSIS — Z94.0 KIDNEY TRANSPLANT STATUS: Chronic | ICD-10-CM

## 2025-05-28 LAB
ALBUMIN SERPL ELPH-MCNC: 4.4 G/DL — SIGNIFICANT CHANGE UP (ref 3.5–5.2)
ALP SERPL-CCNC: 56 U/L — SIGNIFICANT CHANGE UP (ref 30–115)
ALT FLD-CCNC: 12 U/L — SIGNIFICANT CHANGE UP (ref 0–41)
ANION GAP SERPL CALC-SCNC: 14 MMOL/L — SIGNIFICANT CHANGE UP (ref 7–14)
APTT BLD: 31.9 SEC — SIGNIFICANT CHANGE UP (ref 27–39.2)
AST SERPL-CCNC: 12 U/L — SIGNIFICANT CHANGE UP (ref 0–41)
BASOPHILS # BLD AUTO: 0.03 K/UL — SIGNIFICANT CHANGE UP (ref 0–0.2)
BASOPHILS NFR BLD AUTO: 0.4 % — SIGNIFICANT CHANGE UP (ref 0–1)
BILIRUB SERPL-MCNC: 0.2 MG/DL — SIGNIFICANT CHANGE UP (ref 0.2–1.2)
BUN SERPL-MCNC: 38 MG/DL — HIGH (ref 10–20)
CALCIUM SERPL-MCNC: 8.4 MG/DL — SIGNIFICANT CHANGE UP (ref 8.4–10.5)
CHLORIDE SERPL-SCNC: 108 MMOL/L — SIGNIFICANT CHANGE UP (ref 98–110)
CO2 SERPL-SCNC: 18 MMOL/L — SIGNIFICANT CHANGE UP (ref 17–32)
CREAT SERPL-MCNC: 2.3 MG/DL — HIGH (ref 0.7–1.5)
EGFR: 34 ML/MIN/1.73M2 — LOW
EGFR: 34 ML/MIN/1.73M2 — LOW
EOSINOPHIL # BLD AUTO: 0.23 K/UL — SIGNIFICANT CHANGE UP (ref 0–0.7)
EOSINOPHIL NFR BLD AUTO: 2.9 % — SIGNIFICANT CHANGE UP (ref 0–8)
GLUCOSE SERPL-MCNC: 95 MG/DL — SIGNIFICANT CHANGE UP (ref 70–99)
HCT VFR BLD CALC: 34.8 % — LOW (ref 42–52)
HGB BLD-MCNC: 11.2 G/DL — LOW (ref 14–18)
IMM GRANULOCYTES NFR BLD AUTO: 0.4 % — HIGH (ref 0.1–0.3)
INR BLD: 0.84 RATIO — SIGNIFICANT CHANGE UP (ref 0.65–1.3)
LYMPHOCYTES # BLD AUTO: 1.25 K/UL — SIGNIFICANT CHANGE UP (ref 1.2–3.4)
LYMPHOCYTES # BLD AUTO: 15.5 % — LOW (ref 20.5–51.1)
MCHC RBC-ENTMCNC: 30.8 PG — SIGNIFICANT CHANGE UP (ref 27–31)
MCHC RBC-ENTMCNC: 32.2 G/DL — SIGNIFICANT CHANGE UP (ref 32–37)
MCV RBC AUTO: 95.6 FL — HIGH (ref 80–94)
MONOCYTES # BLD AUTO: 0.59 K/UL — SIGNIFICANT CHANGE UP (ref 0.1–0.6)
MONOCYTES NFR BLD AUTO: 7.3 % — SIGNIFICANT CHANGE UP (ref 1.7–9.3)
NEUTROPHILS # BLD AUTO: 5.91 K/UL — SIGNIFICANT CHANGE UP (ref 1.4–6.5)
NEUTROPHILS NFR BLD AUTO: 73.5 % — SIGNIFICANT CHANGE UP (ref 42.2–75.2)
NRBC BLD AUTO-RTO: 0 /100 WBCS — SIGNIFICANT CHANGE UP (ref 0–0)
PLATELET # BLD AUTO: 274 K/UL — SIGNIFICANT CHANGE UP (ref 130–400)
PMV BLD: 9.7 FL — SIGNIFICANT CHANGE UP (ref 7.4–10.4)
POTASSIUM SERPL-MCNC: 5.3 MMOL/L — HIGH (ref 3.5–5)
POTASSIUM SERPL-SCNC: 5.3 MMOL/L — HIGH (ref 3.5–5)
PROT SERPL-MCNC: 6.6 G/DL — SIGNIFICANT CHANGE UP (ref 6–8)
PROTHROM AB SERPL-ACNC: 9.9 SEC — LOW (ref 9.95–12.87)
RBC # BLD: 3.64 M/UL — LOW (ref 4.7–6.1)
RBC # FLD: 12.5 % — SIGNIFICANT CHANGE UP (ref 11.5–14.5)
SODIUM SERPL-SCNC: 140 MMOL/L — SIGNIFICANT CHANGE UP (ref 135–146)
WBC # BLD: 8.04 K/UL — SIGNIFICANT CHANGE UP (ref 4.8–10.8)
WBC # FLD AUTO: 8.04 K/UL — SIGNIFICANT CHANGE UP (ref 4.8–10.8)

## 2025-05-28 PROCEDURE — 99214 OFFICE O/P EST MOD 30 MIN: CPT | Mod: 25

## 2025-05-28 PROCEDURE — 85610 PROTHROMBIN TIME: CPT

## 2025-05-28 PROCEDURE — 93010 ELECTROCARDIOGRAM REPORT: CPT

## 2025-05-28 PROCEDURE — 85025 COMPLETE CBC W/AUTO DIFF WBC: CPT

## 2025-05-28 PROCEDURE — 36415 COLL VENOUS BLD VENIPUNCTURE: CPT

## 2025-05-28 PROCEDURE — 85730 THROMBOPLASTIN TIME PARTIAL: CPT

## 2025-05-28 PROCEDURE — 80053 COMPREHEN METABOLIC PANEL: CPT

## 2025-05-28 PROCEDURE — 93005 ELECTROCARDIOGRAM TRACING: CPT

## 2025-05-28 NOTE — H&P PST ADULT - REASON FOR ADMISSION
PAST for EXCISION OF LEFT THUMB NAIL SQUAMOUS CELL CARCINOMA. COVERAGE WITH FULL THICKNESS SKIN GRAFT FROM RIGHT OR LEFT GROIN

## 2025-05-28 NOTE — H&P PST ADULT - NSICDXPASTSURGICALHX_GEN_ALL_CORE_FT
PAST SURGICAL HISTORY:  History of appendectomy     History of surgery left foot    History of surgery back surgery    History of surgery rt chest catheter    S/P kidney transplant     Status post Mohs surgery

## 2025-05-28 NOTE — H&P PST ADULT - HISTORY OF PRESENT ILLNESS
Patient is a 49  year old  male presenting to PAST in preparation for EXCISION OF LEFT THUMB NAIL SQUAMOUS CELL CARCINOMA. COVERAGE WITH FULL THICKNESS SKIN GRAFT FROM RIGHT OR LEFT GROIN  on 6/11  under LSB anesthesia by Dr. Thomas .    pt with a history of renal transplant, on immunosuppressive, presented with left thumb nailbed SCC after a small injury to his thumb in August 2024. He is s/p MOHs in August with SCC in situ disease at the margins. The lesion continued to grow and he underwent another MOHs surgery in December 2024 and March 2025 and had invasive SCC at the margin. He was referred to radiation onc for consideration of definitive RT, he has positive margins and given his immunocompromised status, adjuvant/definitive RT is recommended  pt to have plastic surgery done 6/11/25, then will decide if radiation is needed      PATIENT/ Guardian CURRENTLY DENIES CHEST PAIN  SHORTNESS OF BREATH  PALPITATIONS,  DYSURIA, OR UPPER RESPIRATORY INFECTION IN PAST 2 WEEKS  denies travel outside the [place] in the past 30 days    Anesthesia Alert  NO--Difficult Airway  NO--History of neck surgery or radiation  NO--Limited ROM of neck  NO--History of Malignant hyperthermia  NO--No personal or family history of Pseudocholinesterase deficiency.  NO--Prior Anesthesia Complication  NO--Latex Allergy  NO--Loose teeth  NO--History of Rheumatoid Arthritis  NO--Bleeding risk  NO--APOORVA  YES--Other_RENALTRANSPLANT 2021____    PT/Guardian DENIES ANY RASHES, ABRASION, OR OPEN WOUNDS OR CUTS    AS PER THE PT/Guardian, THIS IS HIS/HER COMPLETE MEDICAL AND SURGICAL HX, INCLUDING MEDICATIONS PRESCRIBED AND OVER THE COUNTER    Patient/ Guardian verbalized understanding of instructions and was given the opportunity to ask questions and have them answered.    pt/ Guardian denies any suicidal ideation or thoughts, pt states not a threat to self or others    Revised Cardiac Risk Index for Pre-Operative Risk from [email or web address]  on 5/28/2025  ** All calculations should be rechecked by clinician prior to use **    RESULT SUMMARY:  1 points  RCRI Score    6.0 %  Risk of major cardiac event      INPUTS:  High-risk surgery —> 0 = No  History of ischemic heart disease —> 0 = No  History of congestive heart failure —> 0 = No  History of cerebrovascular disease —> 0 = No  Pre-operative treatment with insulin —> 0 = No  Pre-operative creatinine >2 mg/dL / 176.8 µmol/L —> 1 = Yes      Duke Activity Status Index (DASI) from [employer]  on 5/28/2025  ** All calculations should be rechecked by clinician prior to use **    RESULT SUMMARY:  50.2 points  The higher the score (maximum 58.2), the higher the functional status.    8.91 METs      INPUTS:  Take care of self —> 2.75 = Yes  Walk indoors —> 1.75 = Yes  Walk 1&ndash;2 blocks on level ground —> 2.75 = Yes  Climb a flight of stairs or walk up a hill —> 5.5 = Yes  Run a short distance —> 8 = Yes  Do light work around the house —> 2.7 = Yes  Do moderate work around the house —> 3.5 = Yes  Do heavy work around the house —> 0 = No  Do yardwork —> 4.5 = Yes  Have sexual relations —> 5.25 = Yes  Participate in moderate recreational activities —> 6 = Yes  Participate in strenuous sports —> 7.5 = Yes

## 2025-05-28 NOTE — H&P PST ADULT - NSICDXPASTMEDICALHX_GEN_ALL_CORE_FT
PAST MEDICAL HISTORY:  Cancer, skin, squamous cell     CKD (chronic kidney disease)     GERD (gastroesophageal reflux disease)     S/P kidney transplant

## 2025-05-28 NOTE — H&P PST ADULT - NS HP PST LATEX ALLERGY
----- Message from Annalise Yun PA-C sent at 4/2/2025  7:21 PM CDT -----  Metabolic panel does show an elevated BS consistent with patients diabetes. Liver and kidney function are unremarkable   Cholesterol has improved. Triglycerides remain elevated but have improved. Continue working on a low fat diet and exercise.   No

## 2025-05-29 DIAGNOSIS — Z01.818 ENCOUNTER FOR OTHER PREPROCEDURAL EXAMINATION: ICD-10-CM

## 2025-05-29 DIAGNOSIS — C44.92 SQUAMOUS CELL CARCINOMA OF SKIN, UNSPECIFIED: ICD-10-CM

## 2025-06-04 ENCOUNTER — APPOINTMENT (OUTPATIENT)
Dept: NEUROLOGY | Facility: CLINIC | Age: 50
End: 2025-06-04

## 2025-06-10 NOTE — ASU PATIENT PROFILE, ADULT - NSICDXPASTSURGICALHX_GEN_ALL_CORE_FT
PAST SURGICAL HISTORY:  History of appendectomy     History of surgery left foot    History of surgery back surgery    History of surgery rt chest catheter/dyalisis port    S/P kidney transplant left    Status post Mohs surgery left thumb

## 2025-06-10 NOTE — ASU PATIENT PROFILE, ADULT - NSICDXPASTMEDICALHX_GEN_ALL_CORE_FT
PAST MEDICAL HISTORY:  2019 novel coronavirus disease (COVID-19)     Cancer, skin, squamous cell     CKD (chronic kidney disease)     GERD (gastroesophageal reflux disease)     History of neuropathy     S/P kidney transplant left

## 2025-06-11 ENCOUNTER — TRANSCRIPTION ENCOUNTER (OUTPATIENT)
Age: 50
End: 2025-06-11

## 2025-06-11 ENCOUNTER — RESULT REVIEW (OUTPATIENT)
Age: 50
End: 2025-06-11

## 2025-06-11 ENCOUNTER — APPOINTMENT (OUTPATIENT)
Dept: PLASTIC SURGERY | Facility: AMBULATORY SURGERY CENTER | Age: 50
End: 2025-06-11
Payer: MEDICARE

## 2025-06-11 ENCOUNTER — OUTPATIENT (OUTPATIENT)
Dept: OUTPATIENT SERVICES | Facility: HOSPITAL | Age: 50
LOS: 1 days | Discharge: HOME | End: 2025-06-11
Payer: MEDICARE

## 2025-06-11 VITALS
HEIGHT: 67 IN | SYSTOLIC BLOOD PRESSURE: 113 MMHG | DIASTOLIC BLOOD PRESSURE: 81 MMHG | RESPIRATION RATE: 18 BRPM | TEMPERATURE: 98 F | HEART RATE: 75 BPM | WEIGHT: 160.06 LBS | OXYGEN SATURATION: 99 %

## 2025-06-11 VITALS
RESPIRATION RATE: 14 BRPM | SYSTOLIC BLOOD PRESSURE: 128 MMHG | HEART RATE: 60 BPM | DIASTOLIC BLOOD PRESSURE: 76 MMHG | OXYGEN SATURATION: 100 %

## 2025-06-11 DIAGNOSIS — Z86.16 PERSONAL HISTORY OF COVID-19: ICD-10-CM

## 2025-06-11 DIAGNOSIS — Z98.890 OTHER SPECIFIED POSTPROCEDURAL STATES: Chronic | ICD-10-CM

## 2025-06-11 DIAGNOSIS — N18.9 CHRONIC KIDNEY DISEASE, UNSPECIFIED: ICD-10-CM

## 2025-06-11 DIAGNOSIS — C44.629 SQUAMOUS CELL CARCINOMA OF SKIN OF LEFT UPPER LIMB, INCLUDING SHOULDER: ICD-10-CM

## 2025-06-11 DIAGNOSIS — Z94.0 KIDNEY TRANSPLANT STATUS: Chronic | ICD-10-CM

## 2025-06-11 DIAGNOSIS — K21.9 GASTRO-ESOPHAGEAL REFLUX DISEASE WITHOUT ESOPHAGITIS: ICD-10-CM

## 2025-06-11 DIAGNOSIS — C44.92 SQUAMOUS CELL CARCINOMA OF SKIN, UNSPECIFIED: ICD-10-CM

## 2025-06-11 DIAGNOSIS — Z90.49 ACQUIRED ABSENCE OF OTHER SPECIFIED PARTS OF DIGESTIVE TRACT: Chronic | ICD-10-CM

## 2025-06-11 DIAGNOSIS — Z94.0 KIDNEY TRANSPLANT STATUS: ICD-10-CM

## 2025-06-11 PROCEDURE — 88305 TISSUE EXAM BY PATHOLOGIST: CPT | Mod: 26

## 2025-06-11 PROCEDURE — 88342 IMHCHEM/IMCYTCHM 1ST ANTB: CPT | Mod: 26

## 2025-06-11 PROCEDURE — 15220 FTH/GFT FR S/A/L 20 SQ CM/<: CPT

## 2025-06-11 PROCEDURE — 88341 IMHCHEM/IMCYTCHM EA ADD ANTB: CPT

## 2025-06-11 PROCEDURE — 15004 WOUND PREP F/N/HF/G: CPT

## 2025-06-11 PROCEDURE — 88341 IMHCHEM/IMCYTCHM EA ADD ANTB: CPT | Mod: 26

## 2025-06-11 PROCEDURE — 88307 TISSUE EXAM BY PATHOLOGIST: CPT | Mod: 26

## 2025-06-11 PROCEDURE — 88342 IMHCHEM/IMCYTCHM 1ST ANTB: CPT

## 2025-06-11 PROCEDURE — 88307 TISSUE EXAM BY PATHOLOGIST: CPT

## 2025-06-11 PROCEDURE — 88305 TISSUE EXAM BY PATHOLOGIST: CPT

## 2025-06-11 PROCEDURE — 11750 EXCISION NAIL&NAIL MATRIX: CPT

## 2025-06-11 RX ORDER — GABAPENTIN 400 MG/1
1 CAPSULE ORAL
Refills: 0 | DISCHARGE

## 2025-06-11 RX ORDER — TACROLIMUS 0.5 MG/1
2 CAPSULE ORAL
Refills: 0 | DISCHARGE

## 2025-06-11 RX ORDER — TACROLIMUS 0.5 MG/1
1 CAPSULE ORAL
Refills: 0 | DISCHARGE

## 2025-06-11 RX ORDER — TRAMADOL HYDROCHLORIDE 50 MG/1
1 TABLET, FILM COATED ORAL
Qty: 12 | Refills: 0
Start: 2025-06-11 | End: 2025-06-13

## 2025-06-11 RX ORDER — DIPHENOXYLATE HYDROCHLORIDE AND ATROPINE SULFATE .025; 2.5 MG/1; MG/1
2 TABLET ORAL
Refills: 0 | DISCHARGE

## 2025-06-11 RX ORDER — MYCOPHENOLIC ACID 360 MG/1
1 TABLET, DELAYED RELEASE ORAL
Refills: 0 | DISCHARGE

## 2025-06-11 RX ORDER — OXYCODONE HYDROCHLORIDE 30 MG/1
5 TABLET ORAL ONCE
Refills: 0 | Status: DISCONTINUED | OUTPATIENT
Start: 2025-06-11 | End: 2025-06-11

## 2025-06-11 RX ORDER — ONDANSETRON HCL/PF 4 MG/2 ML
4 VIAL (ML) INJECTION ONCE
Refills: 0 | Status: DISCONTINUED | OUTPATIENT
Start: 2025-06-11 | End: 2025-06-11

## 2025-06-11 RX ORDER — DOXYCYCLINE HYCLATE 100 MG
1 TABLET ORAL
Qty: 14 | Refills: 0
Start: 2025-06-11 | End: 2025-06-17

## 2025-06-11 RX ORDER — HYDROMORPHONE/SOD CHLOR,ISO/PF 2 MG/10 ML
0.3 SYRINGE (ML) INJECTION
Refills: 0 | Status: DISCONTINUED | OUTPATIENT
Start: 2025-06-11 | End: 2025-06-11

## 2025-06-11 NOTE — ASU DISCHARGE PLAN (ADULT/PEDIATRIC) - FINANCIAL ASSISTANCE
[facility] provides services at a reduced cost to those who are determined to be eligible through [facility]’s financial assistance program. Information regarding [facility]’s financial assistance program can be found by going to [email or web address] or by calling [phone].

## 2025-06-11 NOTE — ASU DISCHARGE PLAN (ADULT/PEDIATRIC) - CARE PROVIDER_API CALL
Jas Thomas  Plastic Surgery  [address] [place] [id number]  Phone: [phone]  Fax: [phone]  Follow Up Time: 1 week

## 2025-06-11 NOTE — CHART NOTE - NSCHARTNOTEFT_GEN_A_CORE
PACU ANESTHESIA ADMISSION NOTE      Procedure: Ablation, nail bed, finger    Graft, skin, split-thickness, hand      Post op diagnosis:      ____  Intubated  TV:______       Rate: ______      FiO2: ______    _x___  Patent Airway    _x___  Full return of protective reflexes    _x___  Full recovery from anesthesia / back to baseline status    Vitals:  see anesthesia record    Mental Status:  _x___ Awake   _____ Alert   _____ Drowsy   _____ Sedated    Nausea/Vomiting:  _x___  NO       ______Yes,   See Post - Op Orders         Pain Scale (0-10):  __0___    Treatment: _x___ None    ____ See Post - Op/PCA Orders    Post - Operative Fluids:   __x__ Oral   ____ See Post - Op Orders    Plan: Discharge:   _x___Home       _____Floor     _____Critical Care    _____  Other:_________________    Comments:  No anesthesia issues or complications noted.  Discharge when criteria met.

## 2025-06-11 NOTE — ASU DISCHARGE PLAN (ADULT/PEDIATRIC) - ASU DC SPECIAL INSTRUCTIONSFT
Please follow up with Dr. Thomas within 1-2 weeks after discharge from the hospital. You may call [phone] to schedule an appointment.    Please keep hand dry until cleared by Dr. Thomas. Leave all dressings on and in place until seen by Dr. Thomas.     Please take the medications sent to your pharmacy as prescribed.     You may take Tylenol for pain control.

## 2025-06-11 NOTE — BRIEF OPERATIVE NOTE - NSICDXBRIEFPROCEDURE_GEN_ALL_CORE_FT
PROCEDURES:  Ablation, nail bed, finger 11-Jun-2025 08:21:46  Ghassan Engle  Graft, skin, split-thickness, hand 11-Jun-2025 08:22:48  Ghassan Engle

## 2025-06-11 NOTE — BRIEF OPERATIVE NOTE - OPERATION/FINDINGS
Removal of left thumb nail and ablation of germinal and sterile matrix. Reconstruction using full thickness skin graft harvested from left abdomen.

## 2025-06-18 LAB — SURGICAL PATHOLOGY STUDY: SIGNIFICANT CHANGE UP

## 2025-06-19 ENCOUNTER — APPOINTMENT (OUTPATIENT)
Dept: PLASTIC SURGERY | Facility: CLINIC | Age: 50
End: 2025-06-19
Payer: MEDICARE

## 2025-06-19 PROBLEM — U07.1 COVID-19: Chronic | Status: ACTIVE | Noted: 2025-06-11

## 2025-06-19 PROBLEM — Z86.69 PERSONAL HISTORY OF OTHER DISEASES OF THE NERVOUS SYSTEM AND SENSE ORGANS: Chronic | Status: ACTIVE | Noted: 2025-06-11

## 2025-06-19 PROBLEM — Z94.0 KIDNEY TRANSPLANT STATUS: Chronic | Status: ACTIVE | Noted: 2025-05-28

## 2025-06-19 PROCEDURE — 99024 POSTOP FOLLOW-UP VISIT: CPT

## 2025-06-20 PROBLEM — C44.92 SQUAMOUS CELL CARCINOMA OF SKIN, UNSPECIFIED: Chronic | Status: ACTIVE | Noted: 2025-05-28

## 2025-06-24 PROBLEM — M79.2 NEUROPATHIC PAIN: Status: ACTIVE | Noted: 2025-06-24

## 2025-06-24 RX ORDER — GABAPENTIN 300 MG/1
300 CAPSULE ORAL
Qty: 7 | Refills: 0 | Status: ACTIVE | COMMUNITY
Start: 2025-06-24 | End: 1900-01-01

## 2025-06-27 NOTE — ASU PATIENT PROFILE, ADULT - FALL HARM RISK - UNIVERSAL INTERVENTIONS
Bed in lowest position, wheels locked, appropriate side rails in place/Call bell, personal items and telephone in reach/Instruct patient to call for assistance before getting out of bed or chair/Non-slip footwear when patient is out of bed/[place] to call system/Physically safe environment - no spills, clutter or unnecessary equipment/Purposeful Proactive Rounding/Room/bathroom lighting operational, light cord in reach

## 2025-06-30 ENCOUNTER — TRANSCRIPTION ENCOUNTER (OUTPATIENT)
Age: 50
End: 2025-06-30

## 2025-06-30 ENCOUNTER — APPOINTMENT (OUTPATIENT)
Dept: PLASTIC SURGERY | Facility: AMBULATORY SURGERY CENTER | Age: 50
End: 2025-06-30

## 2025-06-30 ENCOUNTER — RESULT REVIEW (OUTPATIENT)
Age: 50
End: 2025-06-30

## 2025-06-30 ENCOUNTER — OUTPATIENT (OUTPATIENT)
Dept: OUTPATIENT SERVICES | Facility: HOSPITAL | Age: 50
LOS: 1 days | Discharge: HOME | End: 2025-06-30
Payer: MEDICARE

## 2025-06-30 VITALS
RESPIRATION RATE: 16 BRPM | SYSTOLIC BLOOD PRESSURE: 142 MMHG | DIASTOLIC BLOOD PRESSURE: 84 MMHG | WEIGHT: 160.06 LBS | HEART RATE: 68 BPM | OXYGEN SATURATION: 100 % | TEMPERATURE: 98 F | HEIGHT: 67 IN

## 2025-06-30 VITALS
SYSTOLIC BLOOD PRESSURE: 138 MMHG | RESPIRATION RATE: 18 BRPM | HEART RATE: 64 BPM | OXYGEN SATURATION: 98 % | DIASTOLIC BLOOD PRESSURE: 84 MMHG

## 2025-06-30 DIAGNOSIS — Z98.890 OTHER SPECIFIED POSTPROCEDURAL STATES: Chronic | ICD-10-CM

## 2025-06-30 DIAGNOSIS — Z94.0 KIDNEY TRANSPLANT STATUS: Chronic | ICD-10-CM

## 2025-06-30 DIAGNOSIS — Z90.49 ACQUIRED ABSENCE OF OTHER SPECIFIED PARTS OF DIGESTIVE TRACT: Chronic | ICD-10-CM

## 2025-06-30 DIAGNOSIS — C44.92 SQUAMOUS CELL CARCINOMA OF SKIN, UNSPECIFIED: ICD-10-CM

## 2025-06-30 PROCEDURE — 88311 DECALCIFY TISSUE: CPT | Mod: 26

## 2025-06-30 PROCEDURE — 88311 DECALCIFY TISSUE: CPT

## 2025-06-30 PROCEDURE — 26910 AMPUTATE METACARPAL BONE: CPT | Mod: 58,FA

## 2025-06-30 PROCEDURE — 88305 TISSUE EXAM BY PATHOLOGIST: CPT

## 2025-06-30 PROCEDURE — 14040 TIS TRNFR F/C/C/M/N/A/G/H/F: CPT | Mod: 58

## 2025-06-30 PROCEDURE — 88305 TISSUE EXAM BY PATHOLOGIST: CPT | Mod: 26

## 2025-06-30 RX ORDER — DOXYCYCLINE HYCLATE 100 MG
1 TABLET ORAL
Qty: 6 | Refills: 0
Start: 2025-06-30 | End: 2025-07-02

## 2025-06-30 RX ORDER — ACETAMINOPHEN 500 MG/5ML
1000 LIQUID (ML) ORAL ONCE
Refills: 0 | Status: DISCONTINUED | OUTPATIENT
Start: 2025-06-30 | End: 2025-06-30

## 2025-06-30 RX ORDER — ONDANSETRON HCL/PF 4 MG/2 ML
4 VIAL (ML) INJECTION ONCE
Refills: 0 | Status: DISCONTINUED | OUTPATIENT
Start: 2025-06-30 | End: 2025-06-30

## 2025-06-30 RX ORDER — HYDROMORPHONE/SOD CHLOR,ISO/PF 2 MG/10 ML
0.5 SYRINGE (ML) INJECTION
Refills: 0 | Status: DISCONTINUED | OUTPATIENT
Start: 2025-06-30 | End: 2025-06-30

## 2025-06-30 RX ORDER — OXYCODONE HYDROCHLORIDE AND ACETAMINOPHEN 10; 325 MG/1; MG/1
1 TABLET ORAL
Qty: 12 | Refills: 0
Start: 2025-06-30 | End: 2025-07-02

## 2025-06-30 RX ORDER — SODIUM CHLORIDE 9 G/1000ML
1000 INJECTION, SOLUTION INTRAVENOUS
Refills: 0 | Status: DISCONTINUED | OUTPATIENT
Start: 2025-06-30 | End: 2025-06-30

## 2025-06-30 RX ORDER — OXYCODONE HYDROCHLORIDE 30 MG/1
5 TABLET ORAL ONCE
Refills: 0 | Status: DISCONTINUED | OUTPATIENT
Start: 2025-06-30 | End: 2025-06-30

## 2025-06-30 RX ADMIN — OXYCODONE HYDROCHLORIDE 5 MILLIGRAM(S): 30 TABLET ORAL at 15:46

## 2025-06-30 RX ADMIN — SODIUM CHLORIDE 100 MILLILITER(S): 9 INJECTION, SOLUTION INTRAVENOUS at 15:41

## 2025-06-30 NOTE — BRIEF OPERATIVE NOTE - NSICDXBRIEFPOSTOP_GEN_ALL_CORE_FT
POST-OP DIAGNOSIS:  Squamous cell carcinoma of skin of left thumb 30-Jun-2025 15:07:37  Josefina Winston

## 2025-06-30 NOTE — ASU DISCHARGE PLAN (ADULT/PEDIATRIC) - CARE PROVIDER_API CALL
Jas Thomas (IV)  Plastic Surgery  [address] [place] [id number]  Phone: [phone]  Fax: [phone]  Follow Up Time:

## 2025-06-30 NOTE — ASU DISCHARGE PLAN (ADULT/PEDIATRIC) - ASU DC SPECIAL INSTRUCTIONSFT
Keep surgical site clean and dry.   Do not remove any dressings or get them wet.   Keep left hand elevated at all times to reduce swelling.     Rest, no lifting.

## 2025-06-30 NOTE — ASU DISCHARGE PLAN (ADULT/PEDIATRIC) - ***IN THE EVENT THAT YOU DEVELOP A COMPLICATION AND YOU ARE UNABLE TO REACH YOUR OWN PHYSICIAN, YOU MAY CONTACT:
Has the medication helped at all? Is there anything that is helping?  If the medication is helping, I will consider increasing the dose slightly.   
Left message for the patient to return to discuss the provider note listed below. Clinic number provided.     
New RX sent.   
Please advise.   
Spoke with the patient, discussed the provider note listed below. Patient states she had been using the medication and it had been helping with her pain. Patient states since her wedding day on Friday she thinks she over did it and aggravated the pain and now that dose is not helping. Informed the patient of the provider note listed below. Patient expressed understanding and would like to use [facility] [place]. Please advise.   
Suzie would like a call regarding her Neurotin. She wanted to know if she could increase the dose. She states she is still having pain.  
Statement Selected

## 2025-06-30 NOTE — CHART NOTE - NSCHARTNOTEFT_GEN_A_CORE
PACU ANESTHESIA ADMISSION NOTE      Procedure: Amputation of Distal Left thumb, Coverage with V-Y advancement flap  Post op diagnosis:  Left thumb Skin Cancer      __x__  Patent Airway    ____  Full return of protective reflexes    ____  Full recovery from anesthesia / back to baseline     Vitals:   T:  97.5 F         R:  12                BP:  110/62                Sat:   97%                P: 84      Mental Status:  _x___ Awake   _____ Alert   _____ Drowsy   _____ Sedated    Nausea/Vomiting:  ____ NO  ______Yes,   See Post - Op Orders          Pain Scale (0-10):  _____    Treatment: ____ None    ___x_ See Post - Op/PCA Orders    Post - Operative Fluids:   ____ Oral   __x__ See Post - Op Orders    Plan: Discharge:   __x__Home       _____Floor     _____Critical Care    _____  Other:_________________    Comments: Pt tolerated procedure well, no anesthesia related complications. Care of pt endorsed to PACU, report given to PACU RN. Discharge when criteria are met.

## 2025-06-30 NOTE — BRIEF OPERATIVE NOTE - NSICDXBRIEFPREOP_GEN_ALL_CORE_FT
PRE-OP DIAGNOSIS:  Squamous cell carcinoma of skin of left thumb 30-Jun-2025 15:07:11  Josefina Winston

## 2025-06-30 NOTE — ASU DISCHARGE PLAN (ADULT/PEDIATRIC) - FINANCIAL ASSISTANCE
[facility] provides services at a reduced cost to those who are determined to be eligible through [facility]’s financial assistance program. Information regarding [facility]’s financial assistance program can be found by going to [email or web address] or by calling [phone].

## 2025-06-30 NOTE — PRE-ANESTHESIA EVALUATION ADULT - NSANTHPMHFT_GEN_ALL_CORE
METS>4 without CP/palpitations/sob  Denies orthopnea  s/p kidney transplant in 2021. Per patient, post-transplant baseline around 2.4 and stable. No steroids.

## 2025-06-30 NOTE — ASU DISCHARGE PLAN (ADULT/PEDIATRIC) - PAIN MANAGEMENT
Doxycycline, Percocet for severe pain/Take over the counter pain medication/Prescriptions electronically submitted to pharmacy from [facility]

## 2025-07-01 RX ORDER — OXYCODONE AND ACETAMINOPHEN 5; 325 MG/1; MG/1
5-325 TABLET ORAL
Qty: 16 | Refills: 0 | Status: ACTIVE | COMMUNITY
Start: 2025-07-01 | End: 1900-01-01

## 2025-07-07 ENCOUNTER — APPOINTMENT (OUTPATIENT)
Dept: PLASTIC SURGERY | Facility: CLINIC | Age: 50
End: 2025-07-07
Payer: MEDICARE

## 2025-07-07 DIAGNOSIS — Z94.0 KIDNEY TRANSPLANT STATUS: ICD-10-CM

## 2025-07-07 DIAGNOSIS — Z85.828 PERSONAL HISTORY OF OTHER MALIGNANT NEOPLASM OF SKIN: ICD-10-CM

## 2025-07-07 DIAGNOSIS — C76.42 MALIGNANT NEOPLASM OF LEFT UPPER LIMB: ICD-10-CM

## 2025-07-07 LAB — SURGICAL PATHOLOGY STUDY: SIGNIFICANT CHANGE UP

## 2025-07-07 PROCEDURE — 99024 POSTOP FOLLOW-UP VISIT: CPT

## 2025-07-08 ENCOUNTER — APPOINTMENT (OUTPATIENT)
Dept: PLASTIC SURGERY | Facility: CLINIC | Age: 50
End: 2025-07-08

## 2025-07-15 ENCOUNTER — APPOINTMENT (OUTPATIENT)
Dept: PLASTIC SURGERY | Facility: CLINIC | Age: 50
End: 2025-07-15
Payer: MEDICARE

## 2025-07-15 PROCEDURE — 99024 POSTOP FOLLOW-UP VISIT: CPT

## 2025-07-22 ENCOUNTER — APPOINTMENT (OUTPATIENT)
Dept: PLASTIC SURGERY | Facility: CLINIC | Age: 50
End: 2025-07-22
Payer: MEDICARE

## 2025-07-22 PROCEDURE — 99024 POSTOP FOLLOW-UP VISIT: CPT

## 2025-07-28 ENCOUNTER — APPOINTMENT (OUTPATIENT)
Dept: PLASTIC SURGERY | Facility: CLINIC | Age: 50
End: 2025-07-28
Payer: MEDICARE

## 2025-07-28 DIAGNOSIS — C44.629 SQUAMOUS CELL CARCINOMA OF SKIN OF LEFT UPPER LIMB, INCLUDING SHOULDER: ICD-10-CM

## 2025-07-28 PROCEDURE — 99024 POSTOP FOLLOW-UP VISIT: CPT

## 2025-08-05 ENCOUNTER — APPOINTMENT (OUTPATIENT)
Dept: NEUROLOGY | Facility: CLINIC | Age: 50
End: 2025-08-05

## 2025-08-28 ENCOUNTER — APPOINTMENT (OUTPATIENT)
Dept: NEUROLOGY | Facility: CLINIC | Age: 50
End: 2025-08-28